# Patient Record
Sex: FEMALE | Race: BLACK OR AFRICAN AMERICAN | NOT HISPANIC OR LATINO | Employment: OTHER | URBAN - METROPOLITAN AREA
[De-identification: names, ages, dates, MRNs, and addresses within clinical notes are randomized per-mention and may not be internally consistent; named-entity substitution may affect disease eponyms.]

---

## 2023-02-09 PROBLEM — I10 HYPERTENSION: Status: ACTIVE | Noted: 2023-02-09

## 2023-02-09 PROBLEM — M25.539 WRIST PAIN: Status: ACTIVE | Noted: 2023-02-09

## 2023-02-09 PROBLEM — M25.561 ACUTE PAIN OF BOTH KNEES: Status: ACTIVE | Noted: 2023-02-09

## 2023-02-09 PROBLEM — E78.5 DYSLIPIDEMIA: Status: ACTIVE | Noted: 2023-02-09

## 2023-02-09 PROBLEM — M16.10 HIP ARTHRITIS: Status: ACTIVE | Noted: 2023-02-09

## 2023-02-09 PROBLEM — E78.5 HYPERLIPIDEMIA: Status: ACTIVE | Noted: 2023-02-09

## 2023-02-09 PROBLEM — N32.81 OVERACTIVE BLADDER: Status: ACTIVE | Noted: 2023-02-09

## 2023-02-09 PROBLEM — H61.23 IMPACTED CERUMEN OF BOTH EARS: Status: ACTIVE | Noted: 2023-02-09

## 2023-02-09 PROBLEM — K59.09 CHRONIC CONSTIPATION: Status: ACTIVE | Noted: 2023-02-09

## 2023-02-09 PROBLEM — R30.0 DYSURIA: Status: ACTIVE | Noted: 2023-02-09

## 2023-02-09 PROBLEM — I73.9 PVD (PERIPHERAL VASCULAR DISEASE) (CMS-HCC): Status: ACTIVE | Noted: 2023-02-09

## 2023-02-09 PROBLEM — G57.10 MERALGIA PARAESTHETICA: Status: ACTIVE | Noted: 2023-02-09

## 2023-02-09 PROBLEM — I25.10 CAD (CORONARY ARTERY DISEASE), NATIVE CORONARY ARTERY: Status: ACTIVE | Noted: 2023-02-09

## 2023-02-09 PROBLEM — R27.0 ATAXIA: Status: ACTIVE | Noted: 2023-02-09

## 2023-02-09 PROBLEM — M19.90 INFLAMMATORY ARTHRITIS: Status: ACTIVE | Noted: 2023-02-09

## 2023-02-09 PROBLEM — M25.562 ACUTE PAIN OF LEFT KNEE: Status: ACTIVE | Noted: 2023-02-09

## 2023-02-09 PROBLEM — Z95.5 CORONARY STENT PATENT: Status: ACTIVE | Noted: 2023-02-09

## 2023-02-09 PROBLEM — K64.8 INTERNAL HEMORRHOIDS: Status: ACTIVE | Noted: 2023-02-09

## 2023-02-09 PROBLEM — L30.9 ECZEMA: Status: ACTIVE | Noted: 2023-02-09

## 2023-02-09 PROBLEM — R73.09 ABNORMAL GLUCOSE: Status: ACTIVE | Noted: 2023-02-09

## 2023-02-09 PROBLEM — M48.062 SPINAL STENOSIS OF LUMBAR REGION WITH NEUROGENIC CLAUDICATION: Status: RESOLVED | Noted: 2023-02-09 | Resolved: 2023-02-09

## 2023-02-09 PROBLEM — M25.562 ACUTE PAIN OF BOTH KNEES: Status: ACTIVE | Noted: 2023-02-09

## 2023-02-09 PROBLEM — R20.0 NUMBNESS IN BOTH HANDS: Status: ACTIVE | Noted: 2023-02-09

## 2023-02-09 PROBLEM — R53.82 CHRONIC FATIGUE: Status: ACTIVE | Noted: 2023-02-09

## 2023-02-09 PROBLEM — M25.512 ACUTE PAIN OF LEFT SHOULDER: Status: ACTIVE | Noted: 2023-02-09

## 2023-02-09 PROBLEM — G62.9 NEUROPATHY: Status: ACTIVE | Noted: 2023-02-09

## 2023-02-09 PROBLEM — G25.81 RESTLESS LEGS SYNDROME: Status: ACTIVE | Noted: 2023-02-09

## 2023-02-09 PROBLEM — N39.0 BACTERIAL UTI: Status: ACTIVE | Noted: 2023-02-09

## 2023-02-09 PROBLEM — M72.2 PLANTAR FASCIITIS OF LEFT FOOT: Status: ACTIVE | Noted: 2023-02-09

## 2023-02-09 PROBLEM — M19.90 OSTEOARTHRITIS: Status: ACTIVE | Noted: 2023-02-09

## 2023-02-09 PROBLEM — M47.12 CERVICAL SPONDYLOSIS WITH MYELOPATHY: Status: ACTIVE | Noted: 2023-02-09

## 2023-02-09 PROBLEM — S70.01XA CONTUSION OF RIGHT HIP: Status: ACTIVE | Noted: 2023-02-09

## 2023-02-09 PROBLEM — K59.00 CONSTIPATION: Status: ACTIVE | Noted: 2023-02-09

## 2023-02-09 PROBLEM — M71.339: Status: ACTIVE | Noted: 2023-02-09

## 2023-02-09 PROBLEM — M48.10 DISH (DIFFUSE IDIOPATHIC SKELETAL HYPEROSTOSIS): Status: ACTIVE | Noted: 2023-02-09

## 2023-02-09 PROBLEM — M43.10 ACQUIRED SPONDYLOLISTHESIS: Status: ACTIVE | Noted: 2023-02-09

## 2023-02-09 PROBLEM — A49.9 BACTERIAL UTI: Status: ACTIVE | Noted: 2023-02-09

## 2023-02-09 PROBLEM — N39.0 UTI (URINARY TRACT INFECTION): Status: ACTIVE | Noted: 2023-02-09

## 2023-02-09 PROBLEM — R26.89 IMBALANCE: Status: ACTIVE | Noted: 2023-02-09

## 2023-02-09 PROBLEM — R92.8 ABNORMAL MAMMOGRAM: Status: ACTIVE | Noted: 2023-02-09

## 2023-02-09 PROBLEM — R92.1 BREAST CALCIFICATION SEEN ON MAMMOGRAM: Status: ACTIVE | Noted: 2023-02-09

## 2023-02-09 PROBLEM — B35.1 ONYCHOMYCOSIS: Status: ACTIVE | Noted: 2023-02-09

## 2023-02-09 PROBLEM — R20.0 THIGH NUMBNESS: Status: ACTIVE | Noted: 2023-02-09

## 2023-02-09 PROBLEM — M65.4 DE QUERVAIN'S TENOSYNOVITIS, LEFT: Status: ACTIVE | Noted: 2023-02-09

## 2023-02-09 PROBLEM — M35.9 UNDIFFERENTIATED CONNECTIVE TISSUE DISEASE (MULTI): Status: ACTIVE | Noted: 2023-02-09

## 2023-02-09 PROBLEM — R29.898 LEG WEAKNESS, BILATERAL: Status: ACTIVE | Noted: 2023-02-09

## 2023-02-09 PROBLEM — R07.9 CHEST PAIN: Status: ACTIVE | Noted: 2023-02-09

## 2023-02-09 PROBLEM — E11.9 DIABETES (MULTI): Status: ACTIVE | Noted: 2023-02-09

## 2023-02-09 PROBLEM — M79.673 HEEL PAIN: Status: ACTIVE | Noted: 2023-02-09

## 2023-02-09 RX ORDER — METOPROLOL TARTRATE 50 MG/1
1 TABLET ORAL DAILY
COMMUNITY

## 2023-02-09 RX ORDER — GLIMEPIRIDE 1 MG/1
1 TABLET ORAL DAILY
COMMUNITY
Start: 2017-12-06 | End: 2023-07-20

## 2023-02-09 RX ORDER — ASPIRIN 81 MG/1
1 TABLET ORAL DAILY
COMMUNITY

## 2023-02-09 RX ORDER — METFORMIN HYDROCHLORIDE 500 MG/1
1 TABLET ORAL
COMMUNITY
Start: 2017-08-28 | End: 2023-11-17 | Stop reason: SDUPTHER

## 2023-02-09 RX ORDER — AMLODIPINE BESYLATE 10 MG/1
1 TABLET ORAL DAILY
COMMUNITY
Start: 2020-07-06 | End: 2023-08-24

## 2023-02-09 RX ORDER — DICLOFENAC EPOLAMINE 0.01 G/1
SYSTEM TOPICAL 2 TIMES DAILY
COMMUNITY
Start: 2019-09-17

## 2023-02-09 RX ORDER — ROSUVASTATIN CALCIUM 20 MG/1
1 TABLET, COATED ORAL EVERY OTHER DAY
COMMUNITY
End: 2023-11-14

## 2023-02-09 RX ORDER — BENAZEPRIL HYDROCHLORIDE AND HYDROCHLOROTHIAZIDE 20; 25 MG/1; MG/1
1 TABLET ORAL DAILY
COMMUNITY
End: 2023-11-30

## 2023-02-09 RX ORDER — RANOLAZINE 500 MG/1
1 TABLET, EXTENDED RELEASE ORAL EVERY 12 HOURS
COMMUNITY
Start: 2017-06-26

## 2023-02-09 RX ORDER — DICLOFENAC SODIUM 10 MG/G
GEL TOPICAL DAILY
COMMUNITY
Start: 2019-09-17

## 2023-02-09 RX ORDER — CLOPIDOGREL BISULFATE 75 MG/1
1 TABLET ORAL DAILY
COMMUNITY
Start: 2014-08-01 | End: 2023-07-20

## 2023-02-09 RX ORDER — BLOOD SUGAR DIAGNOSTIC
STRIP MISCELLANEOUS 2 TIMES DAILY
COMMUNITY
Start: 2018-12-17

## 2023-02-09 RX ORDER — BROMPHENIRAMINE MALEATE, DEXTROMETHORPHAN HBR, PHENYLEPHRINE HCL, DIPHENHYDRAMINE HCL, PHENYLEPHRINE HCL 0.52G
1 KIT ORAL 2 TIMES DAILY
COMMUNITY
Start: 2021-09-21

## 2023-02-09 RX ORDER — BLOOD-GLUCOSE METER
EACH MISCELLANEOUS DAILY
COMMUNITY
Start: 2020-11-05

## 2023-02-09 RX ORDER — EZETIMIBE 10 MG/1
1 TABLET ORAL DAILY
COMMUNITY
Start: 2019-04-29 | End: 2023-08-01

## 2023-02-09 RX ORDER — IBUPROFEN 600 MG/1
1 TABLET ORAL 3 TIMES DAILY PRN
COMMUNITY
Start: 2015-04-27

## 2023-02-09 RX ORDER — POTASSIUM CHLORIDE 750 MG/1
1 TABLET, FILM COATED, EXTENDED RELEASE ORAL DAILY
COMMUNITY
Start: 2017-04-13

## 2023-02-09 RX ORDER — ROPINIROLE 1 MG/1
1 TABLET, FILM COATED ORAL NIGHTLY
COMMUNITY
Start: 2015-04-27 | End: 2023-12-04 | Stop reason: SDUPTHER

## 2023-02-09 RX ORDER — LANCETS
EACH MISCELLANEOUS DAILY
COMMUNITY

## 2023-02-09 RX ORDER — NITROGLYCERIN 0.4 MG/1
1 TABLET SUBLINGUAL AS NEEDED
COMMUNITY
End: 2023-04-07 | Stop reason: SDUPTHER

## 2023-02-09 RX ORDER — TRIAMCINOLONE ACETONIDE 1 MG/G
OINTMENT TOPICAL 2 TIMES DAILY
COMMUNITY
Start: 2022-04-12

## 2023-03-23 ENCOUNTER — APPOINTMENT (OUTPATIENT)
Dept: PRIMARY CARE | Facility: CLINIC | Age: 73
End: 2023-03-23
Payer: MEDICARE

## 2023-04-07 ENCOUNTER — OFFICE VISIT (OUTPATIENT)
Dept: PRIMARY CARE | Facility: CLINIC | Age: 73
End: 2023-04-07
Payer: MEDICARE

## 2023-04-07 VITALS
SYSTOLIC BLOOD PRESSURE: 130 MMHG | RESPIRATION RATE: 16 BRPM | DIASTOLIC BLOOD PRESSURE: 70 MMHG | BODY MASS INDEX: 29.55 KG/M2 | WEIGHT: 156.4 LBS | HEART RATE: 72 BPM | TEMPERATURE: 98.1 F

## 2023-04-07 DIAGNOSIS — I73.9 PVD (PERIPHERAL VASCULAR DISEASE) (CMS-HCC): ICD-10-CM

## 2023-04-07 DIAGNOSIS — I25.119 CORONARY ARTERY DISEASE INVOLVING NATIVE CORONARY ARTERY OF NATIVE HEART WITH ANGINA PECTORIS (CMS-HCC): ICD-10-CM

## 2023-04-07 DIAGNOSIS — I10 PRIMARY HYPERTENSION: Primary | ICD-10-CM

## 2023-04-07 DIAGNOSIS — E78.5 HYPERLIPIDEMIA, UNSPECIFIED HYPERLIPIDEMIA TYPE: ICD-10-CM

## 2023-04-07 DIAGNOSIS — E13.69 OTHER SPECIFIED DIABETES MELLITUS WITH OTHER SPECIFIED COMPLICATION, UNSPECIFIED WHETHER LONG TERM INSULIN USE (MULTI): ICD-10-CM

## 2023-04-07 DIAGNOSIS — M35.9 UNDIFFERENTIATED CONNECTIVE TISSUE DISEASE (MULTI): ICD-10-CM

## 2023-04-07 LAB
POC FINGERSTICK BLOOD GLUCOSE: 145 MG/DL (ref 70–100)
POC HDL CHOLESTEROL: 72 MG/DL (ref 0–50)
POC HEMOGLOBIN A1C: 7.4 % (ref 4.2–6.5)
POC LDL CHOLESTEROL: 74 MG/DL (ref 0–100)
POC NON-HDL CHOLESTEROL: 107 MG/DL (ref 0–130)
POC TOTAL CHOLESTEROL/HDL RATIO: 2.5 (ref 0–4.5)
POC TOTAL CHOLESTEROL: 179 MG/DL (ref 0–199)
POC TRIGLYCERIDES: 165 MG/DL (ref 0–150)

## 2023-04-07 PROCEDURE — 99214 OFFICE O/P EST MOD 30 MIN: CPT | Performed by: INTERNAL MEDICINE

## 2023-04-07 PROCEDURE — 3075F SYST BP GE 130 - 139MM HG: CPT | Performed by: INTERNAL MEDICINE

## 2023-04-07 PROCEDURE — 1160F RVW MEDS BY RX/DR IN RCRD: CPT | Performed by: INTERNAL MEDICINE

## 2023-04-07 PROCEDURE — 1036F TOBACCO NON-USER: CPT | Performed by: INTERNAL MEDICINE

## 2023-04-07 PROCEDURE — 83036 HEMOGLOBIN GLYCOSYLATED A1C: CPT | Performed by: INTERNAL MEDICINE

## 2023-04-07 PROCEDURE — 82962 GLUCOSE BLOOD TEST: CPT | Performed by: INTERNAL MEDICINE

## 2023-04-07 PROCEDURE — 3078F DIAST BP <80 MM HG: CPT | Performed by: INTERNAL MEDICINE

## 2023-04-07 PROCEDURE — 80061 LIPID PANEL: CPT | Performed by: INTERNAL MEDICINE

## 2023-04-07 PROCEDURE — 1159F MED LIST DOCD IN RCRD: CPT | Performed by: INTERNAL MEDICINE

## 2023-04-07 RX ORDER — FLASH GLUCOSE SENSOR
KIT MISCELLANEOUS
Qty: 1 EACH | Refills: 0 | Status: SHIPPED | OUTPATIENT
Start: 2023-04-07

## 2023-04-07 RX ORDER — NITROGLYCERIN 0.4 MG/1
0.4 TABLET SUBLINGUAL EVERY 5 MIN PRN
Qty: 90 TABLET | Refills: 1 | Status: SHIPPED | OUTPATIENT
Start: 2023-04-07 | End: 2023-12-04

## 2023-04-07 NOTE — PROGRESS NOTES
Nighat Holland is a 73 y.o. female   Patient with a past medical history of CAD s/p PCI, DM, HLD, HTN, Osteoarthritis, DISH, Spinal stenosis, PAD, Undifferentiated Connective Tissue disease, OAB, Neuropathy, Mammogram in June, Colonoscopy due in 2028    Dry mouth is bothering her, makes her cough    No chest pain/  SOB/ dizziness  BM OK  Energy level ok  Appetite OK             Review of Systems     Constitutional: no fever, no chills, not feeling poorly, not feeling tired and no recent weight gain, no recent weight loss.   ENT: no earache, no hearing loss, no nosebleeds, no nasal discharge, no sore throat and no hoarseness.   Cardiovascular: the heart rate was not slow, the heart rate was not fast, no chest pain, no palpitations, no intermittent leg claudication and no lower extremity edema.   Respiratory: no cough, wheezing or shortness of breath at rest or exertion  Gastrointestinal: no abdominal pain, no constipation, no melena, no nausea, no diarrhea, no vomiting and no blood in stools.   Musculoskeletal: no arthralgias, no myalgias, no back pain, no joint swelling, no joint stiffness, no limb pain and no limb swelling.   Integumentary: no skin rashes, no skin lesions, no itching, no skin wound and no dry skin.   Neurological: no headache, no confusion, no numbness, no dizziness, no tingling and no fainting.   All other systems have been reviewed and are negative for complaint.       Vitals:    04/07/23 1243   Temp: 36.7 °C (98.1 °F)        Physical Exam     Constitutional   General appearance: Alert and in no acute distress.     Pulmonary   Respiratory assessment: No respiratory distress, normal respiratory rhythm and effort.    Auscultation of Lungs: Clear bilateral breath sounds.   Cardiovascular   Auscultation of heart: Apical pulse normal, heart rate and rhythm normal, normal S1 and S2, no murmurs and no pericardial rub.    Exam trace edema: No peripheral edema.   Abdomen   Abdominal Exam: No bruits,  normal bowel sounds, soft, non-tender, no abdominal mass palpated.    Liver and Spleen exam: No hepato-splenomegaly.   Musculoskeletal   Examination of gait: Normal.    Inspection of digits and nails: No clubbing or cyanosis of the fingernails.    Inspection/palpation of joints, bones and muscles: No joint swelling. Normal movement of all extremities.   Skin   Skin inspection: Normal skin color and pigmentation, normal skin turgor and no visible rash.   Neurologic   Cranial nerves: Nerves 2-12 were intact, no focal neuro defects.     Assessment/Plan          Patient with a past medical history of CAD s/p PCI, DM, HLD, HTN, Osteoarthritis, DISH, Spinal stenosis, PAD,   Undifferentiated Connective Tissue disease, OAB, Neuropathy, Mammogram in June, Colonoscopy due in 2028        # HTN  condition is stable  continue current medications        # HLD  condition is stable  continue current medications        # DM  stable  watch diet     # Chronic Constipation  cont Metamucil     # UCTD  using CBD cream and its helping

## 2023-07-19 DIAGNOSIS — I10 HYPERTENSION, UNSPECIFIED TYPE: ICD-10-CM

## 2023-07-20 RX ORDER — CLOPIDOGREL BISULFATE 75 MG/1
75 TABLET ORAL DAILY
Qty: 90 TABLET | Refills: 3 | Status: SHIPPED | OUTPATIENT
Start: 2023-07-20

## 2023-07-20 RX ORDER — GLIMEPIRIDE 1 MG/1
1 TABLET ORAL DAILY
Qty: 90 TABLET | Refills: 3 | Status: SHIPPED | OUTPATIENT
Start: 2023-07-20 | End: 2024-04-18 | Stop reason: ALTCHOICE

## 2023-08-01 ENCOUNTER — OFFICE VISIT (OUTPATIENT)
Dept: PRIMARY CARE | Facility: CLINIC | Age: 73
End: 2023-08-01
Payer: MEDICARE

## 2023-08-01 VITALS
RESPIRATION RATE: 16 BRPM | DIASTOLIC BLOOD PRESSURE: 70 MMHG | TEMPERATURE: 97.9 F | WEIGHT: 156 LBS | SYSTOLIC BLOOD PRESSURE: 130 MMHG | HEART RATE: 70 BPM | BODY MASS INDEX: 29.48 KG/M2

## 2023-08-01 DIAGNOSIS — E78.5 DYSLIPIDEMIA: ICD-10-CM

## 2023-08-01 DIAGNOSIS — E78.49 OTHER HYPERLIPIDEMIA: ICD-10-CM

## 2023-08-01 DIAGNOSIS — H69.93 EUSTACHIAN TUBE DISORDER, BILATERAL: Primary | ICD-10-CM

## 2023-08-01 DIAGNOSIS — E13.69 OTHER SPECIFIED DIABETES MELLITUS WITH OTHER SPECIFIED COMPLICATION, UNSPECIFIED WHETHER LONG TERM INSULIN USE (MULTI): ICD-10-CM

## 2023-08-01 DIAGNOSIS — I10 PRIMARY HYPERTENSION: ICD-10-CM

## 2023-08-01 LAB
POC FINGERSTICK BLOOD GLUCOSE: 154 MG/DL (ref 70–100)
POC HEMOGLOBIN A1C: 7.5 % (ref 4.2–6.5)

## 2023-08-01 PROCEDURE — 82962 GLUCOSE BLOOD TEST: CPT | Performed by: INTERNAL MEDICINE

## 2023-08-01 PROCEDURE — 3078F DIAST BP <80 MM HG: CPT | Performed by: INTERNAL MEDICINE

## 2023-08-01 PROCEDURE — 1159F MED LIST DOCD IN RCRD: CPT | Performed by: INTERNAL MEDICINE

## 2023-08-01 PROCEDURE — 99214 OFFICE O/P EST MOD 30 MIN: CPT | Performed by: INTERNAL MEDICINE

## 2023-08-01 PROCEDURE — 1126F AMNT PAIN NOTED NONE PRSNT: CPT | Performed by: INTERNAL MEDICINE

## 2023-08-01 PROCEDURE — 1160F RVW MEDS BY RX/DR IN RCRD: CPT | Performed by: INTERNAL MEDICINE

## 2023-08-01 PROCEDURE — 83036 HEMOGLOBIN GLYCOSYLATED A1C: CPT | Performed by: INTERNAL MEDICINE

## 2023-08-01 PROCEDURE — 3075F SYST BP GE 130 - 139MM HG: CPT | Performed by: INTERNAL MEDICINE

## 2023-08-01 PROCEDURE — 1036F TOBACCO NON-USER: CPT | Performed by: INTERNAL MEDICINE

## 2023-08-01 RX ORDER — FLUTICASONE FUROATE 27.5 UG/1
2 SPRAY, METERED NASAL
Qty: 10 G | Refills: 5 | Status: SHIPPED | OUTPATIENT
Start: 2023-08-01 | End: 2023-12-04

## 2023-08-01 RX ORDER — EZETIMIBE 10 MG/1
10 TABLET ORAL DAILY
Qty: 90 TABLET | Refills: 0 | Status: SHIPPED | OUTPATIENT
Start: 2023-08-01 | End: 2023-10-26 | Stop reason: SDUPTHER

## 2023-08-01 ASSESSMENT — ENCOUNTER SYMPTOMS
OCCASIONAL FEELINGS OF UNSTEADINESS: 0
DEPRESSION: 0
LOSS OF SENSATION IN FEET: 0

## 2023-08-01 NOTE — PROGRESS NOTES
Nighat Holland is a 73 y.o. female   Patient with a past medical history of CAD s/p PCI, DM, HLD, HTN, Osteoarthritis, DISH, Spinal stenosis, PAD, Undifferentiated Connective Tissue disease, OAB, Neuropathy, Mammogram in June, Colonoscopy due in 2028    C/o ears plugged  Light headed  Dizzy (vertigo)  Comes and goes    No chest pain/  SOB/ palpitations  BM OK  Energy level ok  Appetite OK             Review of Systems     Constitutional: no fever, no chills, not feeling poorly, not feeling tired and no recent weight gain, no recent weight loss.   ENT: no earache, no hearing loss, no nosebleeds, no nasal discharge, no sore throat and no hoarseness.   Cardiovascular: the heart rate was not slow, the heart rate was not fast, no chest pain, no palpitations, no intermittent leg claudication and no lower extremity edema.   Respiratory: no cough, wheezing or shortness of breath at rest or exertion  Gastrointestinal: no abdominal pain, no constipation, no melena, no nausea, no diarrhea, no vomiting and no blood in stools.   Musculoskeletal: no arthralgias, no myalgias, no back pain, no joint swelling, no joint stiffness, no limb pain and no limb swelling.   Integumentary: no skin rashes, no skin lesions, no itching, no skin wound and no dry skin.   Neurological: no headache, no confusion, no numbness, no dizziness, no tingling and no fainting.   All other systems have been reviewed and are negative for complaint.       Vitals:    08/01/23 1056   BP: 130/70   Pulse: 70   Resp: 16   Temp: 36.6 °C (97.9 °F)        Physical Exam     Constitutional   General appearance: Alert and in no acute distress.     Pulmonary   Respiratory assessment: No respiratory distress, normal respiratory rhythm and effort.    Auscultation of Lungs: Clear bilateral breath sounds.   Cardiovascular   Auscultation of heart: Apical pulse normal, heart rate and rhythm normal, normal S1 and S2, no murmurs and no pericardial rub.    Exam trace edema: No  peripheral edema.   Abdomen   Abdominal Exam: No bruits, normal bowel sounds, soft, non-tender, no abdominal mass palpated.    Liver and Spleen exam: No hepato-splenomegaly.   Musculoskeletal   Examination of gait: Normal.    Inspection of digits and nails: No clubbing or cyanosis of the fingernails.    Inspection/palpation of joints, bones and muscles: No joint swelling. Normal movement of all extremities.   Skin   Skin inspection: Normal skin color and pigmentation, normal skin turgor and no visible rash.   Neurologic   Cranial nerves: Nerves 2-12 were intact, no focal neuro defects.     Assessment/Plan          Patient with a past medical history of CAD s/p PCI, DM, HLD, HTN, Osteoarthritis, DISH, Spinal stenosis, PAD,   Undifferentiated Connective Tissue disease, OAB, Neuropathy, Mammogram in June, Colonoscopy due in 2028        # HTN  condition is stable  continue current medications        # HLD  condition is stable  continue current medications        # DM  Up again  Start Jardiance (if covered, will stop Glimepiride, if not covered will increase to 2 mg)     # Chronic Constipation  cont Metamucil     # UCTD  using CBD cream and its helping    # Eustachian Tube Dysfunction  Start Flonase  ENT referral

## 2023-08-23 DIAGNOSIS — I10 HYPERTENSION, UNSPECIFIED TYPE: ICD-10-CM

## 2023-08-24 RX ORDER — AMLODIPINE BESYLATE 10 MG/1
10 TABLET ORAL DAILY
Qty: 90 TABLET | Refills: 3 | Status: SHIPPED | OUTPATIENT
Start: 2023-08-24 | End: 2024-04-18 | Stop reason: DRUGHIGH

## 2023-10-25 DIAGNOSIS — E78.5 DYSLIPIDEMIA: ICD-10-CM

## 2023-10-26 RX ORDER — EZETIMIBE 10 MG/1
10 TABLET ORAL DAILY
Qty: 90 TABLET | Refills: 3 | Status: SHIPPED | OUTPATIENT
Start: 2023-10-26

## 2023-10-30 ENCOUNTER — TELEPHONE (OUTPATIENT)
Dept: PRIMARY CARE | Facility: CLINIC | Age: 73
End: 2023-10-30
Payer: MEDICARE

## 2023-10-30 NOTE — TELEPHONE ENCOUNTER
PT called in and stated that her appt on 11/3 needs to be coded as her yearly visit. I was not sure if I could change the visit due to the time slot change please advise

## 2023-11-03 ENCOUNTER — APPOINTMENT (OUTPATIENT)
Dept: PRIMARY CARE | Facility: CLINIC | Age: 73
End: 2023-11-03
Payer: MEDICARE

## 2023-11-12 DIAGNOSIS — E78.49 OTHER HYPERLIPIDEMIA: Primary | ICD-10-CM

## 2023-11-14 RX ORDER — ROSUVASTATIN CALCIUM 20 MG/1
20 TABLET, COATED ORAL EVERY OTHER DAY
Qty: 45 TABLET | Refills: 3 | Status: SHIPPED | OUTPATIENT
Start: 2023-11-14 | End: 2023-12-04 | Stop reason: SDUPTHER

## 2023-11-17 DIAGNOSIS — E11.9 TYPE 2 DIABETES MELLITUS WITHOUT COMPLICATION, WITHOUT LONG-TERM CURRENT USE OF INSULIN (MULTI): Primary | ICD-10-CM

## 2023-11-17 RX ORDER — METFORMIN HYDROCHLORIDE 500 MG/1
500 TABLET ORAL
Qty: 180 TABLET | Refills: 0 | Status: SHIPPED | OUTPATIENT
Start: 2023-11-17 | End: 2024-01-16

## 2023-11-29 DIAGNOSIS — I10 PRIMARY HYPERTENSION: ICD-10-CM

## 2023-11-30 RX ORDER — BENAZEPRIL HYDROCHLORIDE AND HYDROCHLOROTHIAZIDE 20; 25 MG/1; MG/1
1 TABLET ORAL DAILY
Qty: 90 TABLET | Refills: 3 | Status: SHIPPED | OUTPATIENT
Start: 2023-11-30

## 2023-12-04 ENCOUNTER — OFFICE VISIT (OUTPATIENT)
Dept: PRIMARY CARE | Facility: CLINIC | Age: 73
End: 2023-12-04
Payer: MEDICARE

## 2023-12-04 VITALS
WEIGHT: 147 LBS | DIASTOLIC BLOOD PRESSURE: 70 MMHG | HEART RATE: 72 BPM | SYSTOLIC BLOOD PRESSURE: 120 MMHG | RESPIRATION RATE: 18 BRPM | BODY MASS INDEX: 27.78 KG/M2

## 2023-12-04 DIAGNOSIS — Z12.31 SCREENING MAMMOGRAM, ENCOUNTER FOR: ICD-10-CM

## 2023-12-04 DIAGNOSIS — E55.9 VITAMIN D DEFICIENCY: ICD-10-CM

## 2023-12-04 DIAGNOSIS — E78.49 OTHER HYPERLIPIDEMIA: ICD-10-CM

## 2023-12-04 DIAGNOSIS — I10 PRIMARY HYPERTENSION: Primary | ICD-10-CM

## 2023-12-04 DIAGNOSIS — E13.69 OTHER SPECIFIED DIABETES MELLITUS WITH OTHER SPECIFIED COMPLICATION, UNSPECIFIED WHETHER LONG TERM INSULIN USE (MULTI): ICD-10-CM

## 2023-12-04 DIAGNOSIS — E78.5 HYPERLIPIDEMIA, UNSPECIFIED HYPERLIPIDEMIA TYPE: ICD-10-CM

## 2023-12-04 PROBLEM — Z28.21 NO VACCINATION-PT REFUSE: Status: ACTIVE | Noted: 2023-12-04

## 2023-12-04 LAB
POC FINGERSTICK BLOOD GLUCOSE: 199 MG/DL (ref 70–100)
POC HDL CHOLESTEROL: 70 MG/DL (ref 0–50)
POC HEMOGLOBIN A1C: 7.1 % (ref 4.2–6.5)
POC LDL CHOLESTEROL: 116 MG/DL (ref 0–100)
POC NON-HDL CHOLESTEROL: 194 MG/DL (ref 0–130)
POC TOTAL CHOLESTEROL/HDL RATIO: 3.8 (ref 0–4.5)
POC TOTAL CHOLESTEROL: 264 MG/DL (ref 0–199)
POC TRIGLYCERIDES: 393 MG/DL (ref 0–150)

## 2023-12-04 PROCEDURE — 3074F SYST BP LT 130 MM HG: CPT | Performed by: INTERNAL MEDICINE

## 2023-12-04 PROCEDURE — 1126F AMNT PAIN NOTED NONE PRSNT: CPT | Performed by: INTERNAL MEDICINE

## 2023-12-04 PROCEDURE — 1036F TOBACCO NON-USER: CPT | Performed by: INTERNAL MEDICINE

## 2023-12-04 PROCEDURE — 99214 OFFICE O/P EST MOD 30 MIN: CPT | Performed by: INTERNAL MEDICINE

## 2023-12-04 PROCEDURE — 1160F RVW MEDS BY RX/DR IN RCRD: CPT | Performed by: INTERNAL MEDICINE

## 2023-12-04 PROCEDURE — 83036 HEMOGLOBIN GLYCOSYLATED A1C: CPT | Performed by: INTERNAL MEDICINE

## 2023-12-04 PROCEDURE — 1159F MED LIST DOCD IN RCRD: CPT | Performed by: INTERNAL MEDICINE

## 2023-12-04 PROCEDURE — 82962 GLUCOSE BLOOD TEST: CPT | Performed by: INTERNAL MEDICINE

## 2023-12-04 PROCEDURE — G0439 PPPS, SUBSEQ VISIT: HCPCS | Performed by: INTERNAL MEDICINE

## 2023-12-04 PROCEDURE — 3078F DIAST BP <80 MM HG: CPT | Performed by: INTERNAL MEDICINE

## 2023-12-04 PROCEDURE — 80061 LIPID PANEL: CPT | Performed by: INTERNAL MEDICINE

## 2023-12-04 RX ORDER — ROSUVASTATIN CALCIUM 20 MG/1
20 TABLET, COATED ORAL EVERY OTHER DAY
Qty: 45 TABLET | Refills: 3 | Status: SHIPPED | OUTPATIENT
Start: 2023-12-04

## 2023-12-04 RX ORDER — ROPINIROLE 1 MG/1
1 TABLET, FILM COATED ORAL NIGHTLY
Qty: 90 TABLET | Refills: 2 | Status: SHIPPED | OUTPATIENT
Start: 2023-12-04 | End: 2023-12-27 | Stop reason: SDUPTHER

## 2023-12-04 ASSESSMENT — PATIENT HEALTH QUESTIONNAIRE - PHQ9
2. FEELING DOWN, DEPRESSED OR HOPELESS: NOT AT ALL
1. LITTLE INTEREST OR PLEASURE IN DOING THINGS: NOT AT ALL
SUM OF ALL RESPONSES TO PHQ9 QUESTIONS 1 AND 2: 0

## 2023-12-04 ASSESSMENT — ENCOUNTER SYMPTOMS
LOSS OF SENSATION IN FEET: 0
DEPRESSION: 0
OCCASIONAL FEELINGS OF UNSTEADINESS: 0

## 2023-12-04 NOTE — PROGRESS NOTES
"Nighat Holland is a 73 y.o. female here for a Medicare Wellness Exam.    No chief complaint on file.       Patient with a past medical history of CAD s/p PCI, DM, HLD, HTN, Osteoarthritis, DISH, Spinal stenosis, PAD, Undifferentiated Connective Tissue disease, OAB, Neuropathy, Mammogram in June, Colonoscopy due in 2028    Feels fine  No chest pain/  SOB/ dizziness  BM OK  Energy level ok  Appetite OK    Cholesterol is up  Ran out         Medicare Wellness Exam    The patent is being seen for a follow up annual wellness visit  Past Medical, Surgical and family History: Reviewed and updated in chart  Interval History: Patient has not been hospitalized previously  Medications and Supplements: Review of all medications by a prescribing practitioner or clinical pharmacist (such as prescriptions, OTC, Herbal therapies and supplements) documented in the medical record.    Patient Self-Assessment of health: Good  Tobacco Use: No  Alcohol Use: Yes  Illicit drug use: No  Patient using opioids: No    Current Diet: in general, an \"unhealthy\" diet  Adequate fluid intake: Yes  Caffeine intake: Yes  Exercise frequency: moderately active    Depression/Suicide screening: PHQ2/ PHQ9 (see screenings tab)    Hearing impairment: Yes  Uses hearing aids N/A  Cognitive impairment Observation: No   Patient or family reported cognitive impairment: No    Bathing: independent  Dressing: independent  Walking: independent  Taking Medications: independent  Feeding: independent  Personal Hygiene: independent  Managing Finances: independent  Shopping: independent  Housework/Basic Home Maintenance: independent  Handling transportation: independent  Preparing meals: independent    Bowels: continent  Bladder: continent    Falls Risk: has notfallen in last 6 months.   Their fall has not resulted in an injury.   Fall risk Factors: Fall Risk Factors:    none   Care Plan Risk: Care Plan: Low/Moderate Risk: Regular physical activity such as walking, water " aerobics or srikanth chi to improve strength, balance, coordination and flexibility. Wear appropriate, sensible shoe wear. Remove fall hazards at home such as loose rugs, obstacles, use non-slip surface in bath or shower. Keep living space well lit.      Home Safety Risk Factors: Home Safety Risk Factors: None  Advanced Directives:  Living will: Yes POA: Yes    Patient's End of Life Decisions: Provider agree to follow.      Past Medical History:   Diagnosis Date    Chest pain, unspecified 12/29/2014    Chest pain    Encounter for gynecological examination (general) (routine) without abnormal findings     Pap test, as part of routine gynecological examination    Essential (primary) hypertension 11/15/2022    Hypertension    Hyperlipidemia, unspecified 11/15/2022    Hyperlipidemia    Personal history of other diseases of the musculoskeletal system and connective tissue     Personal history of arthritis    Personal history of other medical treatment     H/O mammogram    Unspecified atherosclerosis     Atherosclerosis        Review of Systems     Constitutional: no fever, no chills, not feeling poorly, not feeling tired and no recent weight gain, no recent weight loss.   ENT: no earache, no hearing loss, no nosebleeds, no nasal discharge, no sore throat and no hoarseness.   Cardiovascular: the heart rate was not slow, the heart rate was not fast, no chest pain, no palpitations, no intermittent leg claudication and no lower extremity edema.   Respiratory: no cough, wheezing or shortness of breath at rest or exertion  Gastrointestinal: no abdominal pain, no constipation, no melena, no nausea, no diarrhea, no vomiting and no blood in stools.   Musculoskeletal: no arthralgias, no myalgias, no back pain, no joint swelling, no joint stiffness, no limb pain and no limb swelling.   Integumentary: no skin rashes, no skin lesions, no itching, no skin wound and no dry skin.   Neurological: no headache, no confusion, no numbness, no  dizziness, no tingling and no fainting.   All other systems have been reviewed and are negative for complaint.          Physical Exam     Constitutional   General appearance: Alert and in no acute distress.     Pulmonary   Respiratory assessment: No respiratory distress, normal respiratory rhythm and effort.    Auscultation of Lungs: Clear bilateral breath sounds.   Cardiovascular   Auscultation of heart: Apical pulse normal, heart rate and rhythm normal, normal S1 and S2, no murmurs and no pericardial rub.    Exam for edema: No peripheral edema.   Abdomen   Abdominal Exam: No bruits, normal bowel sounds, soft, non-tender, no abdominal mass palpated.    Liver and Spleen exam: No hepato-splenomegaly.   Musculoskeletal   Examination of gait: Normal.    Inspection of digits and nails: No clubbing or cyanosis of the fingernails.    Inspection/palpation of joints, bones and muscles: No joint swelling. Normal movement of all extremities.   Skin   Skin inspection: Normal skin color and pigmentation, normal skin turgor and no visible rash.   Neurologic   Cranial nerves: Nerves 2-12 were intact, no focal neuro defects.          Assessment/Plan            Patient with a past medical history of CAD s/p PCI, DM, HLD, HTN, Osteoarthritis, DISH, Spinal stenosis, PAD, Undifferentiated Connective Tissue disease, OAB, Neuropathy, Mammogram in June, Colonoscopy due in 2028     # DM  Stable  Continue current medications    # HLD  Elevated   Was out f meds  Re start    # CAD  Stable  ASA    # HTN  Stable  Continue current medications

## 2023-12-26 DIAGNOSIS — E78.5 HYPERLIPIDEMIA, UNSPECIFIED HYPERLIPIDEMIA TYPE: ICD-10-CM

## 2023-12-26 DIAGNOSIS — I10 PRIMARY HYPERTENSION: ICD-10-CM

## 2023-12-26 DIAGNOSIS — E78.49 OTHER HYPERLIPIDEMIA: ICD-10-CM

## 2023-12-27 RX ORDER — ROPINIROLE 1 MG/1
1 TABLET, FILM COATED ORAL NIGHTLY
Qty: 90 TABLET | Refills: 2 | Status: SHIPPED | OUTPATIENT
Start: 2023-12-27 | End: 2023-12-29 | Stop reason: SDUPTHER

## 2023-12-29 DIAGNOSIS — I10 PRIMARY HYPERTENSION: ICD-10-CM

## 2023-12-29 DIAGNOSIS — E78.5 HYPERLIPIDEMIA, UNSPECIFIED HYPERLIPIDEMIA TYPE: ICD-10-CM

## 2023-12-29 DIAGNOSIS — E78.49 OTHER HYPERLIPIDEMIA: ICD-10-CM

## 2023-12-29 RX ORDER — ROPINIROLE 1 MG/1
1 TABLET, FILM COATED ORAL NIGHTLY
Qty: 90 TABLET | Refills: 2 | Status: SHIPPED | OUTPATIENT
Start: 2023-12-29 | End: 2024-02-05 | Stop reason: SDUPTHER

## 2024-01-04 ENCOUNTER — APPOINTMENT (OUTPATIENT)
Dept: RADIOLOGY | Facility: CLINIC | Age: 74
End: 2024-01-04
Payer: MEDICARE

## 2024-01-12 DIAGNOSIS — E11.9 TYPE 2 DIABETES MELLITUS WITHOUT COMPLICATION, WITHOUT LONG-TERM CURRENT USE OF INSULIN (MULTI): ICD-10-CM

## 2024-01-16 ENCOUNTER — ANCILLARY PROCEDURE (OUTPATIENT)
Dept: RADIOLOGY | Facility: CLINIC | Age: 74
End: 2024-01-16
Payer: MEDICARE

## 2024-01-16 ENCOUNTER — LAB (OUTPATIENT)
Dept: LAB | Facility: LAB | Age: 74
End: 2024-01-16
Payer: MEDICARE

## 2024-01-16 VITALS — BODY MASS INDEX: 27.76 KG/M2 | HEIGHT: 61 IN | WEIGHT: 147.05 LBS

## 2024-01-16 DIAGNOSIS — E78.5 HYPERLIPIDEMIA, UNSPECIFIED HYPERLIPIDEMIA TYPE: ICD-10-CM

## 2024-01-16 DIAGNOSIS — Z12.31 SCREENING MAMMOGRAM, ENCOUNTER FOR: ICD-10-CM

## 2024-01-16 DIAGNOSIS — E55.9 VITAMIN D DEFICIENCY: ICD-10-CM

## 2024-01-16 DIAGNOSIS — I10 PRIMARY HYPERTENSION: ICD-10-CM

## 2024-01-16 DIAGNOSIS — E13.69 OTHER SPECIFIED DIABETES MELLITUS WITH OTHER SPECIFIED COMPLICATION, UNSPECIFIED WHETHER LONG TERM INSULIN USE (MULTI): ICD-10-CM

## 2024-01-16 LAB
25(OH)D3 SERPL-MCNC: 76 NG/ML (ref 30–100)
ALBUMIN SERPL BCP-MCNC: 4.2 G/DL (ref 3.4–5)
ALP SERPL-CCNC: 58 U/L (ref 33–136)
ALT SERPL W P-5'-P-CCNC: 21 U/L (ref 7–45)
ANION GAP SERPL CALC-SCNC: 16 MMOL/L (ref 10–20)
AST SERPL W P-5'-P-CCNC: 14 U/L (ref 9–39)
BILIRUB SERPL-MCNC: 0.4 MG/DL (ref 0–1.2)
BUN SERPL-MCNC: 12 MG/DL (ref 6–23)
CALCIUM SERPL-MCNC: 10 MG/DL (ref 8.6–10.3)
CHLORIDE SERPL-SCNC: 98 MMOL/L (ref 98–107)
CO2 SERPL-SCNC: 25 MMOL/L (ref 21–32)
CREAT SERPL-MCNC: 0.67 MG/DL (ref 0.5–1.05)
EGFRCR SERPLBLD CKD-EPI 2021: >90 ML/MIN/1.73M*2
ERYTHROCYTE [DISTWIDTH] IN BLOOD BY AUTOMATED COUNT: 17.2 % (ref 11.5–14.5)
GLUCOSE SERPL-MCNC: 131 MG/DL (ref 74–99)
HCT VFR BLD AUTO: 37.7 % (ref 36–46)
HGB BLD-MCNC: 11.7 G/DL (ref 12–16)
MCH RBC QN AUTO: 23.8 PG (ref 26–34)
MCHC RBC AUTO-ENTMCNC: 31 G/DL (ref 32–36)
MCV RBC AUTO: 77 FL (ref 80–100)
NRBC BLD-RTO: 0 /100 WBCS (ref 0–0)
PLATELET # BLD AUTO: 331 X10*3/UL (ref 150–450)
POTASSIUM SERPL-SCNC: 4.6 MMOL/L (ref 3.5–5.3)
PROT SERPL-MCNC: 6.9 G/DL (ref 6.4–8.2)
RBC # BLD AUTO: 4.92 X10*6/UL (ref 4–5.2)
SODIUM SERPL-SCNC: 134 MMOL/L (ref 136–145)
TSH SERPL-ACNC: 2.17 MIU/L (ref 0.44–3.98)
WBC # BLD AUTO: 6 X10*3/UL (ref 4.4–11.3)

## 2024-01-16 PROCEDURE — 77067 SCR MAMMO BI INCL CAD: CPT

## 2024-01-16 PROCEDURE — 80053 COMPREHEN METABOLIC PANEL: CPT

## 2024-01-16 PROCEDURE — 77067 SCR MAMMO BI INCL CAD: CPT | Performed by: STUDENT IN AN ORGANIZED HEALTH CARE EDUCATION/TRAINING PROGRAM

## 2024-01-16 PROCEDURE — 84443 ASSAY THYROID STIM HORMONE: CPT

## 2024-01-16 PROCEDURE — 82306 VITAMIN D 25 HYDROXY: CPT

## 2024-01-16 PROCEDURE — 77063 BREAST TOMOSYNTHESIS BI: CPT | Performed by: STUDENT IN AN ORGANIZED HEALTH CARE EDUCATION/TRAINING PROGRAM

## 2024-01-16 PROCEDURE — 36415 COLL VENOUS BLD VENIPUNCTURE: CPT

## 2024-01-16 PROCEDURE — 85027 COMPLETE CBC AUTOMATED: CPT

## 2024-01-16 RX ORDER — METFORMIN HYDROCHLORIDE 500 MG/1
500 TABLET ORAL
Qty: 180 TABLET | Refills: 3 | Status: SHIPPED | OUTPATIENT
Start: 2024-01-16

## 2024-01-18 ENCOUNTER — OFFICE VISIT (OUTPATIENT)
Dept: OPHTHALMOLOGY | Facility: CLINIC | Age: 74
End: 2024-01-18
Payer: MEDICARE

## 2024-01-18 DIAGNOSIS — E78.5 HYPERLIPIDEMIA, UNSPECIFIED HYPERLIPIDEMIA TYPE: ICD-10-CM

## 2024-01-18 DIAGNOSIS — H25.813 COMBINED FORMS OF AGE-RELATED CATARACT OF BOTH EYES: ICD-10-CM

## 2024-01-18 DIAGNOSIS — H52.4 PRESBYOPIA: ICD-10-CM

## 2024-01-18 DIAGNOSIS — H53.8 BLURRED VISION: Primary | ICD-10-CM

## 2024-01-18 DIAGNOSIS — H04.123 DRY EYES: ICD-10-CM

## 2024-01-18 DIAGNOSIS — I10 PRIMARY HYPERTENSION: ICD-10-CM

## 2024-01-18 DIAGNOSIS — H52.223 REGULAR ASTIGMATISM OF BOTH EYES: ICD-10-CM

## 2024-01-18 DIAGNOSIS — H52.13 MYOPIA, BILATERAL: ICD-10-CM

## 2024-01-18 DIAGNOSIS — E13.69 OTHER SPECIFIED DIABETES MELLITUS WITH OTHER SPECIFIED COMPLICATION, UNSPECIFIED WHETHER LONG TERM INSULIN USE (MULTI): ICD-10-CM

## 2024-01-18 PROCEDURE — 92004 COMPRE OPH EXAM NEW PT 1/>: CPT | Performed by: OPHTHALMOLOGY

## 2024-01-18 PROCEDURE — 92015 DETERMINE REFRACTIVE STATE: CPT | Performed by: OPHTHALMOLOGY

## 2024-01-18 ASSESSMENT — REFRACTION_MANIFEST
OS_CYLINDER: -0.75
OD_AXIS: 109
OD_CYLINDER: -0.25
OS_SPHERE: -1.00
OS_AXIS: 100
OS_ADD: +3.00
OD_SPHERE: -0.25
OD_ADD: +3.00

## 2024-01-18 ASSESSMENT — VISUAL ACUITY
METHOD: SNELLEN - LINEAR
OD_PH_CC: 20/20
OS_PH_CC: 20/20
OS_CC: 20/25-2
OD_CC: 20/25-1

## 2024-01-18 ASSESSMENT — REFRACTION_WEARINGRX
OS_SPHERE: -1.75
OD_AXIS: 125
OS_AXIS: 125
OD_ADD: +2.75
OD_CYLINDER: -0.75
OS_CYLINDER: -0.75
OD_SPHERE: +0.50
OS_ADD: +2.75

## 2024-01-18 ASSESSMENT — TONOMETRY
OD_IOP_MMHG: 16
IOP_METHOD: GOLDMANN APPLANATION
OS_IOP_MMHG: 16

## 2024-01-18 ASSESSMENT — CUP TO DISC RATIO
OS_RATIO: 0.3
OD_RATIO: 0.3

## 2024-01-18 ASSESSMENT — EXTERNAL EXAM - RIGHT EYE: OD_EXAM: NORMAL

## 2024-01-18 ASSESSMENT — SLIT LAMP EXAM - LIDS
COMMENTS: NORMAL
COMMENTS: NORMAL

## 2024-01-18 ASSESSMENT — EXTERNAL EXAM - LEFT EYE: OS_EXAM: NORMAL

## 2024-01-18 NOTE — PROGRESS NOTES
Assessment/Plan   Diagnoses and all orders for this visit:  Blurred vision  Other specified diabetes mellitus with other specified complication, unspecified whether long term insulin use (CMS/[facility])  -no evidence of diabetic retinopathy at the present time  -pt was advised of the importance of good diabetes control and the importance of a yearly dilated diabetic exam    Dry eyes  -Start artificial tears both eyes (OU) qid  -stress compliance    Myopia, bilateral  Regular astigmatism of both eyes  Presbyopia  Refractive error  -give Rx for new glasses    Combined forms of age-related cataract of both eyes  Not visually significant at the present time  continue to monitor    Return for a dilated exam in   12  months or sooner if having any problems

## 2024-02-05 DIAGNOSIS — I10 PRIMARY HYPERTENSION: ICD-10-CM

## 2024-02-05 DIAGNOSIS — E78.49 OTHER HYPERLIPIDEMIA: ICD-10-CM

## 2024-02-05 DIAGNOSIS — E78.5 HYPERLIPIDEMIA, UNSPECIFIED HYPERLIPIDEMIA TYPE: ICD-10-CM

## 2024-02-05 RX ORDER — ROPINIROLE 1 MG/1
1 TABLET, FILM COATED ORAL NIGHTLY
Qty: 90 TABLET | Refills: 2 | Status: SHIPPED | OUTPATIENT
Start: 2024-02-05 | End: 2024-11-01

## 2024-03-19 ENCOUNTER — APPOINTMENT (OUTPATIENT)
Dept: PRIMARY CARE | Facility: CLINIC | Age: 74
End: 2024-03-19
Payer: MEDICARE

## 2024-04-18 ENCOUNTER — OFFICE VISIT (OUTPATIENT)
Dept: PRIMARY CARE | Facility: CLINIC | Age: 74
End: 2024-04-18
Payer: MEDICARE

## 2024-04-18 VITALS
HEART RATE: 68 BPM | RESPIRATION RATE: 16 BRPM | SYSTOLIC BLOOD PRESSURE: 130 MMHG | DIASTOLIC BLOOD PRESSURE: 70 MMHG | TEMPERATURE: 98.5 F | WEIGHT: 145.4 LBS | BODY MASS INDEX: 27.49 KG/M2

## 2024-04-18 DIAGNOSIS — E78.5 DYSLIPIDEMIA: ICD-10-CM

## 2024-04-18 DIAGNOSIS — J84.10 CALCIFIED GRANULOMA OF LUNG (MULTI): Primary | ICD-10-CM

## 2024-04-18 DIAGNOSIS — M35.9 UNDIFFERENTIATED CONNECTIVE TISSUE DISEASE (MULTI): ICD-10-CM

## 2024-04-18 DIAGNOSIS — E13.69 OTHER SPECIFIED DIABETES MELLITUS WITH OTHER SPECIFIED COMPLICATION, UNSPECIFIED WHETHER LONG TERM INSULIN USE (MULTI): ICD-10-CM

## 2024-04-18 DIAGNOSIS — I10 PRIMARY HYPERTENSION: ICD-10-CM

## 2024-04-18 DIAGNOSIS — I25.119 CORONARY ARTERY DISEASE INVOLVING NATIVE CORONARY ARTERY OF NATIVE HEART WITH ANGINA PECTORIS (CMS-HCC): ICD-10-CM

## 2024-04-18 DIAGNOSIS — I10 HYPERTENSION, UNSPECIFIED TYPE: ICD-10-CM

## 2024-04-18 DIAGNOSIS — E78.5 HYPERLIPIDEMIA, UNSPECIFIED HYPERLIPIDEMIA TYPE: ICD-10-CM

## 2024-04-18 PROBLEM — J98.4 CALCIFIED GRANULOMA OF LUNG: Status: ACTIVE | Noted: 2024-04-18

## 2024-04-18 PROBLEM — G82.20 PARAPARESIS (MULTI): Status: ACTIVE | Noted: 2023-02-09

## 2024-04-18 PROBLEM — E55.9 VITAMIN D DEFICIENCY: Status: ACTIVE | Noted: 2024-04-18

## 2024-04-18 PROBLEM — G82.20 PARAPARESIS (MULTI): Status: RESOLVED | Noted: 2023-02-09 | Resolved: 2024-04-18

## 2024-04-18 PROBLEM — R27.0 ATAXIA: Status: RESOLVED | Noted: 2023-02-09 | Resolved: 2024-04-18

## 2024-04-18 LAB
POC FINGERSTICK BLOOD GLUCOSE: 135 MG/DL (ref 70–100)
POC HEMOGLOBIN A1C: 7.1 % (ref 4.2–6.5)

## 2024-04-18 PROCEDURE — 3075F SYST BP GE 130 - 139MM HG: CPT | Performed by: INTERNAL MEDICINE

## 2024-04-18 PROCEDURE — 99214 OFFICE O/P EST MOD 30 MIN: CPT | Performed by: INTERNAL MEDICINE

## 2024-04-18 PROCEDURE — 1036F TOBACCO NON-USER: CPT | Performed by: INTERNAL MEDICINE

## 2024-04-18 PROCEDURE — 3078F DIAST BP <80 MM HG: CPT | Performed by: INTERNAL MEDICINE

## 2024-04-18 PROCEDURE — 1159F MED LIST DOCD IN RCRD: CPT | Performed by: INTERNAL MEDICINE

## 2024-04-18 PROCEDURE — 82962 GLUCOSE BLOOD TEST: CPT | Performed by: INTERNAL MEDICINE

## 2024-04-18 PROCEDURE — 83036 HEMOGLOBIN GLYCOSYLATED A1C: CPT | Performed by: INTERNAL MEDICINE

## 2024-04-18 RX ORDER — AMLODIPINE BESYLATE 10 MG/1
5 TABLET ORAL DAILY
Qty: 90 TABLET | Refills: 3 | Status: SHIPPED | OUTPATIENT
Start: 2024-04-18

## 2024-04-18 NOTE — PROGRESS NOTES
Nighat Holland is a 74 y.o. female   Patient with a past medical history of CAD s/p CABG x 3 in 2003 and s/p PCI of RCA, cardiomyopathy with a EF of 45%, carotid artery stenosis s/p right carotid endarterectomy,  DM, HLD, HTN, Osteoarthritis, DISH, Spinal stenosis, PAD, Undifferentiated Connective Tissue disease, OAB, Neuropathy, Mammogram in June, Colonoscopy due in 2028     Cardiology notes from [facility] reviewed  Drop in EF to 45%  Medications adjusted  Has been getting short of breath on exertion though    Feels fine  No chest pain/  SOB/ dizziness  BM OK  Energy level ok  Appetite OK    Cholesterol is up  Ran out         Review of Systems     Constitutional: no fever, no chills, not feeling poorly, not feeling tired and no recent weight gain, no recent weight loss.   ENT: no earache, no hearing loss, no nosebleeds, no nasal discharge, no sore throat and no hoarseness.   Cardiovascular: the heart rate was not slow, the heart rate was not fast, no chest pain, no palpitations, no intermittent leg claudication and no lower extremity edema.   Respiratory: no cough, wheezing or shortness of breath at rest or exertion  Gastrointestinal: no abdominal pain, no constipation, no melena, no nausea, no diarrhea, no vomiting and no blood in stools.   Musculoskeletal: no arthralgias, no myalgias, no back pain, no joint swelling, no joint stiffness, no limb pain and no limb swelling.   Integumentary: no skin rashes, no skin lesions, no itching, no skin wound and no dry skin.   Neurological: no headache, no confusion, no numbness, no dizziness, no tingling and no fainting.   All other systems have been reviewed and are negative for complaint.       Vitals:    04/18/24 1240   BP: 130/70   Pulse: 68   Resp: 16   Temp: 36.9 °C (98.5 °F)        Physical Exam     Constitutional   General appearance: Alert and in no acute distress.     Pulmonary   Respiratory assessment: No respiratory distress, normal respiratory rhythm and effort.     Auscultation of Lungs: Clear bilateral breath sounds.   Cardiovascular   Auscultation of heart: Apical pulse normal, heart rate and rhythm normal, normal S1 and S2, no murmurs and no pericardial rub.    Exam for edema: No peripheral edema.   Abdomen   Abdominal Exam: No bruits, normal bowel sounds, soft, non-tender, no abdominal mass palpated.    Liver and Spleen exam: No hepato-splenomegaly.   Musculoskeletal   Examination of gait: Normal.    Inspection of digits and nails: No clubbing or cyanosis of the fingernails.    Inspection/palpation of joints, bones and muscles: No joint swelling. Normal movement of all extremities.   Skin   Skin inspection: Normal skin color and pigmentation, normal skin turgor and no visible rash.   Neurologic   Cranial nerves: Nerves 2-12 were intact, no focal neuro defects.     Assessment/Plan          Patient with a past medical history of CAD s/p CABG x 3 in 2003 and s/p PCI of RCA, cardiomyopathy with a EF of 45%, carotid artery stenosis s/p right carotid endarterectomy,  DM, HLD, HTN, Osteoarthritis, DISH, Spinal stenosis, PAD, Undifferentiated Connective Tissue disease, OAB, Neuropathy, Mammogram in June, Colonoscopy due in 2028     # HTN  Stable  Continue current medications    # DM  Stable  Continue current medications  Off Amaryl    # CAD/ CMP  Continue Jardiance  Ranexa    # HLD  Target LDL is less than 70  Continue current medications  Watch salt, avoid excessive caffeine  Avoid processed meats/ sugars/juices Instead eat fresh fruit  Add walnuts and almonds to your diet  exercise 6 days a week for 30 minutes

## 2024-05-23 ENCOUNTER — OFFICE VISIT (OUTPATIENT)
Dept: OTOLARYNGOLOGY | Facility: CLINIC | Age: 74
End: 2024-05-23
Payer: MEDICARE

## 2024-05-23 VITALS — WEIGHT: 145 LBS | BODY MASS INDEX: 27.41 KG/M2

## 2024-05-23 DIAGNOSIS — H61.23 BILATERAL IMPACTED CERUMEN: Primary | ICD-10-CM

## 2024-05-23 PROCEDURE — 1036F TOBACCO NON-USER: CPT | Performed by: GENERAL PRACTICE

## 2024-05-23 PROCEDURE — 69210 REMOVE IMPACTED EAR WAX UNI: CPT | Performed by: GENERAL PRACTICE

## 2024-05-23 PROCEDURE — 99213 OFFICE O/P EST LOW 20 MIN: CPT | Performed by: GENERAL PRACTICE

## 2024-05-23 PROCEDURE — 1159F MED LIST DOCD IN RCRD: CPT | Performed by: GENERAL PRACTICE

## 2024-05-23 ASSESSMENT — PATIENT HEALTH QUESTIONNAIRE - PHQ9
1. LITTLE INTEREST OR PLEASURE IN DOING THINGS: NOT AT ALL
2. FEELING DOWN, DEPRESSED OR HOPELESS: NOT AT ALL
SUM OF ALL RESPONSES TO PHQ9 QUESTIONS 1 AND 2: 0

## 2024-05-23 NOTE — PROGRESS NOTES
Otolaryngology - Head and Neck Surgery Outpatient New Patient Visit Note        Assessment/Plan   Problem List Items Addressed This Visit    None  Visit Diagnoses         Codes    Bilateral impacted cerumen    -  Primary H61.23            Cerumen impaction b/l, relief of ear fullness with debridement. No otitis. TM mobile to valsalva.       Follow up:  -plan for follow up in clinic as needed    All of the above findings, impressions, treatment planning and follow up plans were discussed with the patient who indicated understanding.  the patient was instructed to contact or return to clinic sooner if symptoms/signs persist or worsen despite the above management.      Fede Huffman MD  Otolaryngology - Head and Neck Surgery            History Of Present Illness  Nighat Holland is a 74 y.o. female presenting for ear fullness b/l.  Associates with recent flight.       The patient reports a history of ear wax buildup requiring debridement    The pt reports gradual return of ear canal fullness and plugged sensation.  Reports some muffled hearing.    Denies otalgia, otorrhea.    Denies recent significant history of otitis media or externa.    Denies prior significant history of otologic surgery or trauma.             Past Medical History  She has a past medical history of Chest pain, unspecified (12/29/2014), Encounter for gynecological examination (general) (routine) without abnormal findings, Essential (primary) hypertension (11/15/2022), Hyperlipidemia, unspecified (11/15/2022), Personal history of other diseases of the musculoskeletal system and connective tissue, Personal history of other medical treatment, and Unspecified atherosclerosis.    Surgical History  She has a past surgical history that includes Other surgical history (02/10/2014); Other surgical history (02/10/2014); Colonoscopy (02/13/2014); Other surgical history (02/13/2014); Hysterectomy (02/13/2014); Other surgical history (02/13/2014); Coronary artery  bypass graft (02/13/2014); Tubal ligation (06/09/2014); and Other surgical history (08/26/2019).     Social History  She reports that she has never smoked. She has never used smokeless tobacco. She reports that she does not currently use alcohol. She reports that she does not use drugs.    Family History  Family History   Problem Relation Name Age of Onset    Colon cancer Mother      Liver cancer Mother      Colon cancer Father      Hypertension Father      Lung cancer Father      Colon cancer Other          Allergies  Other    Review of Systems  ROS: Pertinent positives as noted in HPI.    - CONSTITUTIONAL: Does not report weight loss, fever or chills.    - HEENT:   Ear: Does not report tinnitus, vertigo,    , otalgia, otorrhea  Nose: Does not report congestion, rhinorrhea, epistaxis, decreased smell  Throat: Does not report pain, dysphagia, odynophagia  Larynx: Does not report hoarseness,  difficulty breathing, pain with speaking (odynophonia)  Neck: Does not report new masses, pain, swelling  Face: Does not report sinus pain, pressure, swelling, numbness, weakness     - RESPIRATORY: Does not report SOB or cough.    - CV: Does not report palpitations or chest pain.     - GI: Does not report abdominal pain, nausea, vomiting or diarrhea.    - : Does not report dysuria or urinary frequency.    - MSK: Does not report myalgia or joint pain.    - SKIN: Does not report rash or pruritus.    - NEUROLOGICAL: Does not report headache or syncope.    - PSYCHIATRIC: Does not report recent changes in mood. Does not report anxiety or depression.         Physical Exam:     GENERAL:   Alert & Oriented to person, place and time; Normal affect and appearance. Well developed and well nourished. Conversant & cooperative with examination.     HEAD:   Normocephalic, atraumatic. No sinus tenderness to palpation. Normal parotid bilaterally. Normal facial strength.     NEUROLOGIC:   Cranial nerves II-XII grossly intact, gait WNL. Normal  mood and affect.    EYES:   Extraocular movements intact. Pupils equal, round, reactive to light and accommodation. No nystagmus, no ptosis. no scleral injection.    EAR:   Normal auricle. No discomfort or TTP with manipulation.   Handheld otoscopic exam showed normal external auditory canals bilaterally. No purulence or EAC inflammation. Obstructive cerumen b/l removed with forceps.   Right tympanic membrane clear and mobile without evidence of perforation, retraction or middle ear effusion.   Left tympanic membrane clear and mobile without evidence of perforation, retraction or middle ear effusion.     NOSE:   No external deformity. No external nasal lesions, lacerations, or scars. Nasal tip symmetrical with normal nasal valves.   Nasal cavity with essentially midline septum, normal mucosa and turbinates. No lesions, masses, purulence or polyps.     OC/OP:   Mucous membranes moist, no masses, lesions or exudates.   Normal tongue, floor of mouth, teeth, gums, lips. Normal posterior pharyngeal wall.    Normal tonsils without erythema, exudate or obvious calculi     NECK:   No neck masses or thyroid enlargement. Trachea midline. No tenderness to palpation    LYMPHATIC:   No cervical lymphadenopathy.     RESPIRATORY:   Symmetric chest elevation & no retractions. No significant hoarseness. No increased work of breathing.    CV:   No clubbing or cyanosis. No obvious edema    Skin:   No facial rashes, vesicles or lesions.     Extremities:   No gross abnormalities      Clinic Procedure    Binocular microscopy exam  Indication: tympanic membrane(s) could not be visualized adequately with handheld otoscopy.   Location:  bilateral ears  Visualization Instrument: A microscope was used to visualize through a speculum placed in the ear canal(s) to visualize the ear canal, tympanic membranes and to assist in assessment and removal of debris.  Findings:  see physical exam documentation  Patient Status: The patient tolerated the  procedure well.   Complications: There were no complications.     Information review:  External sources (notes, imaging, lab results) listed below personally reviewed to aid in medical decision making process.  -  -  -

## 2024-06-04 ENCOUNTER — OFFICE VISIT (OUTPATIENT)
Dept: NEUROLOGY | Facility: CLINIC | Age: 74
End: 2024-06-04
Payer: MEDICARE

## 2024-06-04 VITALS
BODY MASS INDEX: 27.15 KG/M2 | HEIGHT: 61 IN | HEART RATE: 69 BPM | SYSTOLIC BLOOD PRESSURE: 115 MMHG | DIASTOLIC BLOOD PRESSURE: 63 MMHG | WEIGHT: 143.8 LBS

## 2024-06-04 DIAGNOSIS — M48.062 SPINAL STENOSIS OF LUMBAR REGION WITH NEUROGENIC CLAUDICATION: ICD-10-CM

## 2024-06-04 DIAGNOSIS — M47.12 CERVICAL SPONDYLOSIS WITH MYELOPATHY: Primary | ICD-10-CM

## 2024-06-04 DIAGNOSIS — G25.81 RESTLESS LEGS SYNDROME: ICD-10-CM

## 2024-06-04 PROCEDURE — 3074F SYST BP LT 130 MM HG: CPT | Performed by: PSYCHIATRY & NEUROLOGY

## 2024-06-04 PROCEDURE — 3078F DIAST BP <80 MM HG: CPT | Performed by: PSYCHIATRY & NEUROLOGY

## 2024-06-04 PROCEDURE — 99214 OFFICE O/P EST MOD 30 MIN: CPT | Performed by: PSYCHIATRY & NEUROLOGY

## 2024-06-04 PROCEDURE — 1159F MED LIST DOCD IN RCRD: CPT | Performed by: PSYCHIATRY & NEUROLOGY

## 2024-06-04 PROCEDURE — 1160F RVW MEDS BY RX/DR IN RCRD: CPT | Performed by: PSYCHIATRY & NEUROLOGY

## 2024-06-04 NOTE — PATIENT INSTRUCTIONS
As we discussed, your neurological exam today is stable except that there is a bit of numbness in the left fifth finger.  This could be from the ulnar nerve at the elbow or could be from your cervical spine.  We discussed that you should try to avoid leaning on the left elbow and you should try to keep the left arm straight at the elbow is much as possible, making it less likely that you will be impinging the ulnar nerve.    If you note worsening weakness or worsening numbness of your left hand, contact my office and we can evaluate further such as with another EMG.  You indicated however that you are not interested in considering any surgical procedure except as a last resort.    I did not change the ropinirole regimen since it is controlling your restless legs symptoms.  Contact the office when you need refills.    Please see me in the office in 1 year.

## 2024-06-04 NOTE — PROGRESS NOTES
Subjective     Nighat Holland is a 74 y.o. year old female seen in follow-up for lumbar canal stenosis, cervical spondylosis with cord deformation, RLS.    HPI    74-year-old right-handed woman with past medical history significant for hypertension, diabetes, coronary disease status post CABG in 2003 and stenting, right carotid endarterectomy in 2003, hysterectomy, DISH, dyslipidemia. She gives neurologic history significant for restless legs syndrome noted for about the past 5 years, improved by a bedtime dose of ropinirole.     I saw her initially on 4/12/19 for a complaint of numbness over the anterior thighs. She described a relatively diffuse area of disturbance, more so than typical for meralgia paresthetica. She had undergone an EMG in 2013 by Dr. Victor, unremarkable but not assessing lateral femoral cutaneous responses. I recommended thoracic spine MRI, which showed no cord compression, cord signal abnormality or other obvious explanation of her symptoms.     I saw her again on 12/13/19 and her complaints had not improved. I sent her for an MRI of the lumbar spine at that time, which showed moderately severe canal stenosis at L4/5. I recommended spine surgery consultation with Dr. Hernandes. She did see him but opted not to consider lumbar spine surgery.     I saw her on 9/21/2020 via virtual visit, at which visit her complaints were more diffuse. I recommended cervical spine MRI which showed spondylosis most severe at C5/6 and C6/7, at which levels there is at least mild cord deformation. Possibly a small focus of right hemicord signal abnormality on axials. On STIR sagittal there is a linear hyperintensity extending from C5-C7 within the center of the cord, which is not well appreciated on T2.     When I discussed the cervical spine MRI results with her I recommended she consult with surgery, but she indicated that she did not wish to do so at the present time and that spine surgery would be a last resort.  "Accordingly I reviewed cervical spine precautions. I then saw her in the office for an exam on 10/21/2020.     I evaluated her again on 4/19/2021, by A/V visit. She indicated improved symptoms since starting an exercise class. I recommended she continue the 1 mg nightly dose of ropinirole.     I evaluated her again on 5/10/2022 in the office. She indicated for the most part stable complaints but did endorse a relatively new complaint of numbness in both hands. I recommended EMG to evaluate for median neuropathy at the wrist.     EMG showed no evidence of right or left median neuropathy at the wrist. No evidence of right or left cervical radiculopathy.    I evaluated her most recently on 5/2/2023 in the office.  She reported adequate control of RLS.  The previous complaint of hand numbness had improved considerably.  She did not endorse symptoms of worsening cervical myelopathy.    She is evaluated again today in the office.    Her RLS remains under good control on ropinirole 1 mg nightly.  She is perceiving no side effects of ropinirole.    She reports no major issues with gait/balance.  She denies falls and does not generally require assistive devices; she has a cane that she uses only on rare occasions, mainly because it has a folding seat that she can use if she needs to rest.    She denies numbness in the fingertips on a constant basis.  She does endorse paresthesias in the left upper extremity exacerbated by leaning on the elbow or holding the elbow bent.  Her  strength is reported as \"terrible\" which she relates to arthritic fingers.  She has assistive devices to help with opening bottles and jars.    Subjectively leg strength is reasonably good.  She has no difficulty climbing stairs.    Walking endurance is reported as stable at roughly 3-4 minutes before she needs to rest.  She does aerobics, dancing and chair exercises.  Standing endurance is reported as reasonably good.    She does note low back pain " particularly first thing in the morning and applies CBD cream which helps.  Only very rarely does she take ibuprofen.    She denies bladder or bowel dysfunction.    She indicates that she has lost about 15 pounds on Jardiance.    Review of Systems    As per the history of present illness    Patient Active Problem List   Diagnosis    Abnormal glucose    Abnormal mammogram    Acquired spondylolisthesis    Acute pain of both knees    Acute pain of left knee    Acute pain of left shoulder    Bacterial UTI    Breast calcification seen on mammogram    Coronary artery disease involving native coronary artery of native heart with angina pectoris (CMS-[facility])    Cervical spondylosis with myelopathy    Chest pain    Chronic constipation    Chronic fatigue    Constipation    Contusion of right hip    Coronary stent patent    De Quervain's tenosynovitis, left    Diabetes (Multi)    DISH (diffuse idiopathic skeletal hyperostosis)    Dyslipidemia    Dysuria    Eczema    Heel pain    Hip arthritis    Hyperlipidemia    Hypertension    Imbalance    Impacted cerumen of both ears    Inflammatory arthritis    Internal hemorrhoids    Meralgia paraesthetica    Neuropathy    Numbness in both hands    Onychomycosis    Osteoarthritis    Other bursal cyst, unspecified wrist    Overactive bladder    Plantar fasciitis of left foot    PVD (peripheral vascular disease) (CMS-[facility])    Restless legs syndrome    Thigh numbness    Undifferentiated connective tissue disease (Multi)    UTI (urinary tract infection)    Wrist pain    No vaccination-pt refuse    Vitamin D deficiency    Calcified granuloma of lung (Multi)     Past Medical History:   Diagnosis Date    Chest pain, unspecified 12/29/2014    Chest pain    Encounter for gynecological examination (general) (routine) without abnormal findings     Pap test, as part of routine gynecological examination    Essential (primary) hypertension 11/15/2022    Hypertension    Hyperlipidemia, unspecified  11/15/2022    Hyperlipidemia    Personal history of other diseases of the musculoskeletal system and connective tissue     Personal history of arthritis    Personal history of other medical treatment     H/O mammogram    Unspecified atherosclerosis     Atherosclerosis     Past Surgical History:   Procedure Laterality Date    COLONOSCOPY  02/13/2014    Complete Colonoscopy    CORONARY ARTERY BYPASS GRAFT  02/13/2014    Coronary Artery Surgery    HYSTERECTOMY  02/13/2014    Hysterectomy    OTHER SURGICAL HISTORY  02/10/2014    Enteroenterostomy, Anastomosis    OTHER SURGICAL HISTORY  02/10/2014    Bypass Graft (Non-Vein) Aortic-carotid    OTHER SURGICAL HISTORY  02/13/2014    Cardiac Catheter His Ablation    OTHER SURGICAL HISTORY  02/13/2014    Neurological Surgery Carotid Endarterectomy    OTHER SURGICAL HISTORY  08/26/2019    Cardiac catheterization with stent placement    TUBAL LIGATION  06/09/2014    Tubal Ligation     Social History     Tobacco Use    Smoking status: Never    Smokeless tobacco: Never   Substance Use Topics    Alcohol use: Not Currently     family history includes Colon cancer in her father, mother, and another family member; Hypertension in her father; Liver cancer in her mother; Lung cancer in her father.    Current Outpatient Medications:     amLODIPine (Norvasc) 10 mg tablet, Take 0.5 tablets (5 mg) by mouth once daily., Disp: 90 tablet, Rfl: 3    aspirin 81 mg EC tablet, Take 1 tablet (81 mg) by mouth once daily., Disp: , Rfl:     benazepriL-hydrochlorothiazide (Lotensin HCT) 20-25 mg tablet, TAKE 1 TABLET BY MOUTH DAILY, Disp: 90 tablet, Rfl: 3    blood sugar diagnostic (Contour Next Test Strips) strip, in the morning and at bedtime., Disp: , Rfl:     blood sugar diagnostic (OneTouch Verio test strips) strip, 1 (one) time each day., Disp: , Rfl:     clopidogrel (Plavix) 75 mg tablet, TAKE 1 TABLET BY MOUTH  DAILY, Disp: 90 tablet, Rfl: 3    diclofenac epolamine 1.3 % patch, Place on the  skin twice a day. APPLY PATCH TO AFFECTED AREA, Disp: , Rfl:     diclofenac sodium (Voltaren) 1 % gel gel, Place on the skin 1 (one) time each day. APPLY SPARINGLY TO AFFECTED AREA, Disp: , Rfl:     empagliflozin (Jardiance) 25 mg, Take 1 tablet (25 mg) by mouth once daily., Disp: 90 tablet, Rfl: 2    ezetimibe (Zetia) 10 mg tablet, TAKE 1 TABLET BY MOUTH DAILY, Disp: 90 tablet, Rfl: 3    fluticasone (Flonase Sensimist) 27.5 mcg/actuation nasal spray, Administer 2 sprays into each nostril once daily., Disp: 10 g, Rfl: 5    FreeStyle Chato sensor system (FreeStyle Chato 2 Sensor) kit, Use as instructed, Disp: 1 each, Rfl: 0    ibuprofen 600 mg tablet, Take 1 tablet (600 mg) by mouth 3 times a day as needed (take with food)., Disp: , Rfl:     lancets (OneTouch UltraSoft Lancets) misc, 1 (one) time each day., Disp: , Rfl:     metFORMIN (Glucophage) 500 mg tablet, TAKE 1 TABLET BY MOUTH TWICE  DAILY WITH MEALS, Disp: 180 tablet, Rfl: 3    metoprolol tartrate (Lopressor) 50 mg tablet, Take 1 tablet by mouth once daily., Disp: , Rfl:     nitroglycerin (Nitrostat) 0.4 mg SL tablet, Place 1 tablet (0.4 mg) under the tongue every 5 minutes if needed for chest pain., Disp: 90 tablet, Rfl: 1    potassium chloride CR 10 mEq ER tablet, Take 1 tablet (10 mEq) by mouth once daily., Disp: , Rfl:     psyllium (Metamucil) 0.52 gram capsule, Take 1 capsule (0.52 g) by mouth 2 times a day., Disp: , Rfl:     ranolazine (Ranexa) 500 mg 12 hr tablet, Take 1 tablet (500 mg) by mouth every 12 hours., Disp: , Rfl:     rOPINIRole (Requip) 1 mg tablet, Take 1 tablet (1 mg) by mouth once daily at bedtime., Disp: 90 tablet, Rfl: 2    rosuvastatin (Crestor) 20 mg tablet, Take 1 tablet (20 mg) by mouth every other day., Disp: 45 tablet, Rfl: 3    triamcinolone (Kenalog) 0.1 % ointment, twice a day. APPLY AND GENTLY MASSAGE INTO AFFECTED AREA(S), Disp: , Rfl:   Allergies   Allergen Reactions    Other Unknown       Objective   Neurological  Exam  Physical Exam    Physical Examination:    General: Alert woman who was ambulatory without assistive devices.      Motor: Muscle tone was normal throughout.  There was no pronator drift or asymmetry of finger taps.  Confrontation strength was symmetrically 5/5 throughout with the following exceptions: 4+ left middle deltoid, 4-4+ bilateral triceps and left ADM (the latter confounded by digit 5 pain), 4+ bilateral hip flexors and hamstrings.    Sensation: Pin was symmetrically sharp over the volar fingertips with the exception of the volar and dorsal aspects of left hand digit 5.  Pin was sharp over the left ulnar palm and dorsal ulnar portion of the back of the hand.  Romberg sign was absent.    Station: Intact and stable.    Gait: Stable and unremarkable.  Intact tandem.        Assessment/Plan     She reports good control of RLS and I did not change her ropinirole regimen.    She does not have history or exam findings concerning for progressively worsening cervical myelopathy.  She reiterates in any event that she does not want to consider any surgery except as an absolute last resort.    She has some sensory reduction in left hand digit 5 on testing and mild weakness of the ADM although exam is potentially confounded by arthritic pain in the finger.  I discussed that she may have mild ulnar neuropathy or at least transient irritation of the ulnar nerve at the elbow.  I reviewed conservative measures for management of this.  Should it progressively worsen she is to let me know and we can proceed with EMG but again she does not want to consider any kind of surgical intervention.    She is stable from the standpoint of lumbar stenosis complaints.  She notes adequate relief of intermittent back pain with CBD applied topically.    I advised her to follow-up in the office in 1 year.

## 2024-06-27 DIAGNOSIS — I10 HYPERTENSION, UNSPECIFIED TYPE: ICD-10-CM

## 2024-06-27 RX ORDER — CLOPIDOGREL BISULFATE 75 MG/1
75 TABLET ORAL DAILY
Qty: 90 TABLET | Refills: 3 | Status: SHIPPED | OUTPATIENT
Start: 2024-06-27

## 2024-07-24 DIAGNOSIS — I10 PRIMARY HYPERTENSION: ICD-10-CM

## 2024-07-24 RX ORDER — BENAZEPRIL HYDROCHLORIDE AND HYDROCHLOROTHIAZIDE 20; 25 MG/1; MG/1
1 TABLET ORAL DAILY
Qty: 90 TABLET | Refills: 3 | Status: SHIPPED | OUTPATIENT
Start: 2024-07-24

## 2024-07-24 NOTE — TELEPHONE ENCOUNTER
Rx Refill Request Telephone Encounter    Name:  Nighat Holland  :  [id number]  Specific Pharmacy location:  [facility]

## 2024-08-08 ENCOUNTER — APPOINTMENT (OUTPATIENT)
Dept: PRIMARY CARE | Facility: CLINIC | Age: 74
End: 2024-08-08
Payer: MEDICARE

## 2024-08-08 VITALS
HEART RATE: 68 BPM | TEMPERATURE: 97.8 F | SYSTOLIC BLOOD PRESSURE: 120 MMHG | DIASTOLIC BLOOD PRESSURE: 80 MMHG | RESPIRATION RATE: 16 BRPM | BODY MASS INDEX: 26.91 KG/M2 | WEIGHT: 142.4 LBS

## 2024-08-08 DIAGNOSIS — I10 PRIMARY HYPERTENSION: Primary | ICD-10-CM

## 2024-08-08 DIAGNOSIS — I25.119 CORONARY ARTERY DISEASE INVOLVING NATIVE CORONARY ARTERY OF NATIVE HEART WITH ANGINA PECTORIS (CMS-HCC): ICD-10-CM

## 2024-08-08 DIAGNOSIS — E78.5 DYSLIPIDEMIA: ICD-10-CM

## 2024-08-08 DIAGNOSIS — M19.90 INFLAMMATORY ARTHRITIS: ICD-10-CM

## 2024-08-08 DIAGNOSIS — E13.69 OTHER SPECIFIED DIABETES MELLITUS WITH OTHER SPECIFIED COMPLICATION, UNSPECIFIED WHETHER LONG TERM INSULIN USE (MULTI): ICD-10-CM

## 2024-08-08 LAB
POC FINGERSTICK BLOOD GLUCOSE: 157 MG/DL (ref 70–100)
POC HEMOGLOBIN A1C: 7.5 % (ref 4.2–6.5)

## 2024-08-08 PROCEDURE — 3074F SYST BP LT 130 MM HG: CPT | Performed by: INTERNAL MEDICINE

## 2024-08-08 PROCEDURE — 3079F DIAST BP 80-89 MM HG: CPT | Performed by: INTERNAL MEDICINE

## 2024-08-08 PROCEDURE — 99214 OFFICE O/P EST MOD 30 MIN: CPT | Performed by: INTERNAL MEDICINE

## 2024-08-08 PROCEDURE — 83036 HEMOGLOBIN GLYCOSYLATED A1C: CPT | Performed by: INTERNAL MEDICINE

## 2024-08-08 PROCEDURE — 1036F TOBACCO NON-USER: CPT | Performed by: INTERNAL MEDICINE

## 2024-08-08 PROCEDURE — 82962 GLUCOSE BLOOD TEST: CPT | Performed by: INTERNAL MEDICINE

## 2024-08-08 RX ORDER — IBUPROFEN 600 MG/1
600 TABLET ORAL 3 TIMES DAILY PRN
Qty: 90 TABLET | Refills: 1 | Status: SHIPPED | OUTPATIENT
Start: 2024-08-08

## 2024-08-08 RX ORDER — METOPROLOL SUCCINATE 50 MG/1
75 TABLET, EXTENDED RELEASE ORAL DAILY
Qty: 45 TABLET | Refills: 5 | Status: SHIPPED | OUTPATIENT
Start: 2024-08-08 | End: 2025-02-04

## 2024-08-08 NOTE — PROGRESS NOTES
Nighat Holland is a 74 y.o. female   Patient with a past medical history of CAD s/p CABG x 3 in 2003 and s/p PCI of RCA, cardiomyopathy with a EF of 45%, carotid artery stenosis s/p right carotid endarterectomy,  DM, HLD, HTN, Osteoarthritis, DISH, Spinal stenosis, PAD, Undifferentiated Connective Tissue disease, OAB, Neuropathy, Mammogram in June, Colonoscopy due in 2028     Feels fine  No chest pain/  SOB/ dizziness  BM OK  Energy level ok  Appetite OK    Average blood sugars 120  No polyuria  No polydipsia  Nocturia            Review of Systems     Constitutional: no fever, no chills, not feeling poorly, not feeling tired and no recent weight gain, no recent weight loss.   ENT: no earache, no hearing loss, no nosebleeds, no nasal discharge, no sore throat and no hoarseness.   Cardiovascular: the heart rate was not slow, the heart rate was not fast, no chest pain, no palpitations, no intermittent leg claudication and no lower extremity edema.   Respiratory: no cough, wheezing or shortness of breath at rest or exertion  Gastrointestinal: no abdominal pain, no constipation, no melena, no nausea, no diarrhea, no vomiting and no blood in stools.   Musculoskeletal: no arthralgias, no myalgias, no back pain, no joint swelling, no joint stiffness, no limb pain and no limb swelling.   Integumentary: no skin rashes, no skin lesions, no itching, no skin wound and no dry skin.   Neurological: no headache, no confusion, no numbness, no dizziness, no tingling and no fainting.   All other systems have been reviewed and are negative for complaint.     Current Outpatient Medications   Medication Instructions    amLODIPine (NORVASC) 5 mg, oral, Daily    aspirin 81 mg EC tablet 1 tablet, oral, Daily    benazepriL-hydrochlorothiazide (Lotensin HCT) 20-25 mg tablet 1 tablet, oral, Daily    blood sugar diagnostic (Contour Next Test Strips) strip 2 times daily    blood sugar diagnostic (OneTouch Verio test strips) strip Daily     clopidogrel (PLAVIX) 75 mg, oral, Daily    diclofenac epolamine 1.3 % patch transdermal, 2 times daily, APPLY PATCH TO AFFECTED AREA    diclofenac sodium (Voltaren) 1 % gel gel transdermal, Daily, APPLY SPARINGLY TO AFFECTED AREA    empagliflozin (JARDIANCE) 25 mg, oral, Daily    ezetimibe (ZETIA) 10 mg, oral, Daily    fluticasone (Flonase Sensimist) 27.5 mcg/actuation nasal spray 2 sprays, Each Nostril, Daily RT    FreeStyle Chato sensor system (FreeStyle Chato 2 Sensor) kit Use as instructed    ibuprofen 600 mg tablet 1 tablet, oral, 3 times daily PRN    lancets (OneTouch UltraSoft Lancets) misc miscellaneous, Daily    metFORMIN (GLUCOPHAGE) 500 mg, oral, 2 times daily (morning and late afternoon)    metoprolol tartrate (Lopressor) 50 mg tablet 1 tablet, oral, Daily    nitroglycerin (NITROSTAT) 0.4 mg, sublingual, Every 5 min PRN    potassium chloride CR 10 mEq ER tablet 1 tablet, oral, Daily    psyllium (Metamucil) 0.52 gram capsule 1 capsule, oral, 2 times daily    ranolazine (Ranexa) 500 mg 12 hr tablet 1 tablet, oral, Every 12 hours    rOPINIRole (REQUIP) 1 mg, oral, Nightly    rosuvastatin (CRESTOR) 20 mg, oral, Every other day    triamcinolone (Kenalog) 0.1 % ointment 2 times daily, APPLY AND GENTLY MASSAGE INTO AFFECTED AREA(S)         Vitals:    08/08/24 1113   Temp: 36.6 °C (97.8 °F)        Physical Exam     Constitutional   General appearance: Alert and in no acute distress.     Pulmonary   Respiratory assessment: No respiratory distress, normal respiratory rhythm and effort.    Auscultation of Lungs: Clear bilateral breath sounds.   Cardiovascular   Auscultation of heart: Apical pulse normal, heart rate and rhythm normal, normal S1 and S2, no murmurs and no pericardial rub.    Exam for edema: No peripheral edema.   Abdomen   Abdominal Exam: No bruits, normal bowel sounds, soft, non-tender, no abdominal mass palpated.    Liver and Spleen exam: No hepato-splenomegaly.   Musculoskeletal   Examination of  gait: Normal.    Inspection of digits and nails: No clubbing or cyanosis of the fingernails.    Inspection/palpation of joints, bones and muscles: No joint swelling. Normal movement of all extremities.   Skin   Skin inspection: Normal skin color and pigmentation, normal skin turgor and no visible rash.   Neurologic   Cranial nerves: Nerves 2-12 were intact, no focal neuro defects.    Assessment/Plan          Patient with a past medical history of CAD s/p CABG x 3 in 2003 and s/p PCI of RCA, cardiomyopathy with a EF of 45%, carotid artery stenosis s/p right carotid endarterectomy, DM, HLD, HTN, Osteoarthritis, DISH, Spinal stenosis, PAD, Undifferentiated Connective Tissue disease, OAB, Neuropathy, Mammogram in June, Colonoscopy due in 2028     # HTN  Stable  Continue current medications    # HLD  Stable  Continue current medications  Watch salt, avoid excessive caffeine  Avoid processed meats/ sugars/juices Instead eat fresh fruit  Add walnuts and almonds to your diet  exercise 6 days a week for 30 minutes    # DM  Rise in A1c  Not watching diet  Cut back on the sweets    # CAD  Does get symptomatic on occasions  Metoprolol increased to 1 and a 1/2 tablet by cardiology    # OA  Minimize use of Ibuprofen  Take tylenol first  CBD Oil is helping too

## 2024-08-20 DIAGNOSIS — I10 PRIMARY HYPERTENSION: ICD-10-CM

## 2024-08-20 DIAGNOSIS — E13.69 OTHER SPECIFIED DIABETES MELLITUS WITH OTHER SPECIFIED COMPLICATION, UNSPECIFIED WHETHER LONG TERM INSULIN USE (MULTI): ICD-10-CM

## 2024-08-20 DIAGNOSIS — E78.5 HYPERLIPIDEMIA, UNSPECIFIED HYPERLIPIDEMIA TYPE: ICD-10-CM

## 2024-08-28 DIAGNOSIS — I10 HYPERTENSION, UNSPECIFIED TYPE: ICD-10-CM

## 2024-08-28 DIAGNOSIS — I10 PRIMARY HYPERTENSION: ICD-10-CM

## 2024-08-28 DIAGNOSIS — I25.119 CORONARY ARTERY DISEASE INVOLVING NATIVE CORONARY ARTERY OF NATIVE HEART WITH ANGINA PECTORIS (CMS-HCC): ICD-10-CM

## 2024-08-28 DIAGNOSIS — E13.69 OTHER SPECIFIED DIABETES MELLITUS WITH OTHER SPECIFIED COMPLICATION, UNSPECIFIED WHETHER LONG TERM INSULIN USE (MULTI): ICD-10-CM

## 2024-08-28 RX ORDER — METOPROLOL SUCCINATE 50 MG/1
75 TABLET, EXTENDED RELEASE ORAL DAILY
Qty: 45 TABLET | Refills: 5 | Status: SHIPPED | OUTPATIENT
Start: 2024-08-28 | End: 2025-02-24

## 2024-08-28 RX ORDER — BENAZEPRIL HYDROCHLORIDE AND HYDROCHLOROTHIAZIDE 20; 25 MG/1; MG/1
1 TABLET ORAL DAILY
Qty: 90 TABLET | Refills: 3 | Status: SHIPPED | OUTPATIENT
Start: 2024-08-28

## 2024-08-28 RX ORDER — CLOPIDOGREL BISULFATE 75 MG/1
75 TABLET ORAL DAILY
Qty: 90 TABLET | Refills: 3 | Status: SHIPPED | OUTPATIENT
Start: 2024-08-28

## 2024-08-28 NOTE — TELEPHONE ENCOUNTER
Rx Refill Request Telephone Encounter    Name:  Nighat Holland  :  [id number]  Specific Pharmacy location:  [facility]

## 2024-08-28 NOTE — TELEPHONE ENCOUNTER
Rx Refill Request Telephone Encounter     Specific Pharmacy location: [facility]   Pt called in and stated to send rx to [facility].

## 2024-11-11 DIAGNOSIS — E78.49 OTHER HYPERLIPIDEMIA: ICD-10-CM

## 2024-11-11 DIAGNOSIS — E78.5 HYPERLIPIDEMIA, UNSPECIFIED HYPERLIPIDEMIA TYPE: ICD-10-CM

## 2024-11-11 DIAGNOSIS — I10 PRIMARY HYPERTENSION: ICD-10-CM

## 2024-11-12 RX ORDER — ROPINIROLE 1 MG/1
1 TABLET, FILM COATED ORAL NIGHTLY
Qty: 90 TABLET | Refills: 3 | Status: SHIPPED | OUTPATIENT
Start: 2024-11-12

## 2024-11-25 ENCOUNTER — LAB (OUTPATIENT)
Dept: LAB | Facility: LAB | Age: 74
End: 2024-11-25
Payer: MEDICARE

## 2024-11-25 DIAGNOSIS — E78.5 HYPERLIPIDEMIA, UNSPECIFIED HYPERLIPIDEMIA TYPE: ICD-10-CM

## 2024-11-25 DIAGNOSIS — E78.49 OTHER HYPERLIPIDEMIA: ICD-10-CM

## 2024-11-25 DIAGNOSIS — I10 PRIMARY HYPERTENSION: ICD-10-CM

## 2024-11-25 LAB
ALBUMIN SERPL BCP-MCNC: 4.6 G/DL (ref 3.4–5)
ALP SERPL-CCNC: 56 U/L (ref 33–136)
ALT SERPL W P-5'-P-CCNC: 18 U/L (ref 7–45)
ANION GAP SERPL CALC-SCNC: 15 MMOL/L (ref 10–20)
AST SERPL W P-5'-P-CCNC: 16 U/L (ref 9–39)
BILIRUB SERPL-MCNC: 0.5 MG/DL (ref 0–1.2)
BUN SERPL-MCNC: 15 MG/DL (ref 6–23)
CALCIUM SERPL-MCNC: 9.6 MG/DL (ref 8.6–10.3)
CHLORIDE SERPL-SCNC: 101 MMOL/L (ref 98–107)
CO2 SERPL-SCNC: 25 MMOL/L (ref 21–32)
CREAT SERPL-MCNC: 0.74 MG/DL (ref 0.5–1.05)
EGFRCR SERPLBLD CKD-EPI 2021: 84 ML/MIN/1.73M*2
ERYTHROCYTE [DISTWIDTH] IN BLOOD BY AUTOMATED COUNT: 17.1 % (ref 11.5–14.5)
GLUCOSE SERPL-MCNC: 147 MG/DL (ref 74–99)
HCT VFR BLD AUTO: 39 % (ref 36–46)
HGB BLD-MCNC: 11.8 G/DL (ref 12–16)
MCH RBC QN AUTO: 22.8 PG (ref 26–34)
MCHC RBC AUTO-ENTMCNC: 30.3 G/DL (ref 32–36)
MCV RBC AUTO: 75 FL (ref 80–100)
NRBC BLD-RTO: 0 /100 WBCS (ref 0–0)
PLATELET # BLD AUTO: 310 X10*3/UL (ref 150–450)
POTASSIUM SERPL-SCNC: 4.7 MMOL/L (ref 3.5–5.3)
PROT SERPL-MCNC: 7 G/DL (ref 6.4–8.2)
RBC # BLD AUTO: 5.17 X10*6/UL (ref 4–5.2)
SODIUM SERPL-SCNC: 136 MMOL/L (ref 136–145)
TSH SERPL-ACNC: 2.92 MIU/L (ref 0.44–3.98)
WBC # BLD AUTO: 6.2 X10*3/UL (ref 4.4–11.3)

## 2024-11-25 PROCEDURE — 80053 COMPREHEN METABOLIC PANEL: CPT

## 2024-11-25 PROCEDURE — 36415 COLL VENOUS BLD VENIPUNCTURE: CPT

## 2024-11-25 PROCEDURE — 85027 COMPLETE CBC AUTOMATED: CPT

## 2024-11-25 PROCEDURE — 84443 ASSAY THYROID STIM HORMONE: CPT

## 2024-12-10 ENCOUNTER — APPOINTMENT (OUTPATIENT)
Dept: PRIMARY CARE | Facility: CLINIC | Age: 74
End: 2024-12-10
Payer: MEDICARE

## 2024-12-10 VITALS
BODY MASS INDEX: 26.94 KG/M2 | RESPIRATION RATE: 16 BRPM | SYSTOLIC BLOOD PRESSURE: 110 MMHG | WEIGHT: 142.6 LBS | DIASTOLIC BLOOD PRESSURE: 70 MMHG | TEMPERATURE: 97.8 F | HEART RATE: 68 BPM

## 2024-12-10 DIAGNOSIS — E13.69 OTHER SPECIFIED DIABETES MELLITUS WITH OTHER SPECIFIED COMPLICATION, UNSPECIFIED WHETHER LONG TERM INSULIN USE (MULTI): ICD-10-CM

## 2024-12-10 DIAGNOSIS — I10 PRIMARY HYPERTENSION: Primary | ICD-10-CM

## 2024-12-10 DIAGNOSIS — E78.5 DYSLIPIDEMIA: ICD-10-CM

## 2024-12-10 DIAGNOSIS — Z12.31 SCREENING MAMMOGRAM, ENCOUNTER FOR: ICD-10-CM

## 2024-12-10 DIAGNOSIS — D50.9 IRON DEFICIENCY ANEMIA, UNSPECIFIED IRON DEFICIENCY ANEMIA TYPE: ICD-10-CM

## 2024-12-10 LAB
POC FINGERSTICK BLOOD GLUCOSE: 157 MG/DL (ref 70–100)
POC HEMOGLOBIN A1C: 7.5 % (ref 4.2–6.5)

## 2024-12-10 PROCEDURE — 99214 OFFICE O/P EST MOD 30 MIN: CPT | Performed by: INTERNAL MEDICINE

## 2024-12-10 PROCEDURE — G0444 DEPRESSION SCREEN ANNUAL: HCPCS | Performed by: INTERNAL MEDICINE

## 2024-12-10 PROCEDURE — 3078F DIAST BP <80 MM HG: CPT | Performed by: INTERNAL MEDICINE

## 2024-12-10 PROCEDURE — 1160F RVW MEDS BY RX/DR IN RCRD: CPT | Performed by: INTERNAL MEDICINE

## 2024-12-10 PROCEDURE — G0439 PPPS, SUBSEQ VISIT: HCPCS | Performed by: INTERNAL MEDICINE

## 2024-12-10 PROCEDURE — 1158F ADVNC CARE PLAN TLK DOCD: CPT | Performed by: INTERNAL MEDICINE

## 2024-12-10 PROCEDURE — 83036 HEMOGLOBIN GLYCOSYLATED A1C: CPT | Performed by: INTERNAL MEDICINE

## 2024-12-10 PROCEDURE — 82962 GLUCOSE BLOOD TEST: CPT | Performed by: INTERNAL MEDICINE

## 2024-12-10 PROCEDURE — 1123F ACP DISCUSS/DSCN MKR DOCD: CPT | Performed by: INTERNAL MEDICINE

## 2024-12-10 PROCEDURE — 1036F TOBACCO NON-USER: CPT | Performed by: INTERNAL MEDICINE

## 2024-12-10 PROCEDURE — 1159F MED LIST DOCD IN RCRD: CPT | Performed by: INTERNAL MEDICINE

## 2024-12-10 PROCEDURE — 3074F SYST BP LT 130 MM HG: CPT | Performed by: INTERNAL MEDICINE

## 2024-12-10 RX ORDER — DULAGLUTIDE 0.75 MG/.5ML
0.75 INJECTION, SOLUTION SUBCUTANEOUS WEEKLY
Qty: 2 ML | Refills: 11 | Status: SHIPPED | OUTPATIENT
Start: 2024-12-10

## 2024-12-10 ASSESSMENT — ENCOUNTER SYMPTOMS
OCCASIONAL FEELINGS OF UNSTEADINESS: 0
LOSS OF SENSATION IN FEET: 0
DEPRESSION: 0

## 2024-12-10 ASSESSMENT — PATIENT HEALTH QUESTIONNAIRE - PHQ9
2. FEELING DOWN, DEPRESSED OR HOPELESS: NOT AT ALL
SUM OF ALL RESPONSES TO PHQ9 QUESTIONS 1 AND 2: 0
1. LITTLE INTEREST OR PLEASURE IN DOING THINGS: NOT AT ALL

## 2024-12-10 NOTE — PROGRESS NOTES
"Nighat Holland is a 74 y.o. female here for a Medicare Wellness Exam.    Chief Complaint   Patient presents with    Annual Exam        Patient with a past medical history of CAD s/p CABG x 3 in 2003 and s/p PCI of RCA, cardiomyopathy with a EF of 45%, carotid artery stenosis s/p right carotid endarterectomy, DM, HLD, HTN, Osteoarthritis, DISH, Spinal stenosis, PAD, Undifferentiated Connective Tissue disease, OAB, Neuropathy, Mammogram in June, Colonoscopy due in 2028     Feels fine  No chest pain/  SOB/ dizziness  BM OK  Energy level ok  Appetite OK             Medicare Wellness Exam    The patent is being seen for a follow up annual wellness visit  Past Medical, Surgical and family History: Reviewed and updated in chart  Interval History: Patient has not been hospitalized previously  Medications and Supplements: Review of all medications by a prescribing practitioner or clinical pharmacist (such as prescriptions, OTC, Herbal therapies and supplements) documented in the medical record.    Patient Self-Assessment of health: Good  Tobacco Use: No  Alcohol Use: No  Illicit drug use: No  Patient using opioids: No    Current Diet: in general, an \"unhealthy\" diet  Adequate fluid intake: Yes  Caffeine intake: Yes  Exercise frequency: moderately active    Depression/Suicide screening: PHQ2/ PHQ9 (see screenings tab)    Hearing impairment: No  Uses hearing aids N/A  Cognitive impairment Observation: No   Patient or family reported cognitive impairment: No    Bathing: independent  Dressing: independent  Walking: independent  Taking Medications: independent  Feeding: independent  Personal Hygiene: independent  Managing Finances: independent  Shopping: independent  Housework/Basic Home Maintenance: independent  Handling transportation: independent  Preparing meals: independent    Bowels: continent  Bladder: incontinent    Falls Risk: has notfallen in last 6 months.   Their fall has not resulted in an injury.   Fall risk " Factors: Fall Risk Factors: Antihypertensive Use none   Care Plan Risk: Care Plan: Low/Moderate Risk: Regular physical activity such as walking, water aerobics or srikanth chi to improve strength, balance, coordination and flexibility. Wear appropriate, sensible shoe wear. Remove fall hazards at home such as loose rugs, obstacles, use non-slip surface in bath or shower. Keep living space well lit.      Home Safety Risk Factors: Home Safety Risk Factors: None  Advanced Directives:  Living will: Yes POA: Yes    Patient's End of Life Decisions: Provider agree to follow.      Past Medical History:   Diagnosis Date    Chest pain, unspecified 12/29/2014    Chest pain    Encounter for gynecological examination (general) (routine) without abnormal findings     Pap test, as part of routine gynecological examination    Essential (primary) hypertension 11/15/2022    Hypertension    Hyperlipidemia, unspecified 11/15/2022    Hyperlipidemia    Personal history of other diseases of the musculoskeletal system and connective tissue     Personal history of arthritis    Personal history of other medical treatment     H/O mammogram    Unspecified atherosclerosis     Atherosclerosis        Current Outpatient Medications   Medication Instructions    amLODIPine (NORVASC) 5 mg, oral, Daily    aspirin 81 mg EC tablet 1 tablet, oral, Daily    benazepriL-hydrochlorothiazide (Lotensin HCT) 20-25 mg tablet 1 tablet, oral, Daily    blood sugar diagnostic (OneTouch Verio test strips) strip Daily    clopidogrel (PLAVIX) 75 mg, oral, Daily    diclofenac epolamine 1.3 % patch transdermal, 2 times daily, APPLY PATCH TO AFFECTED AREA    diclofenac sodium (Voltaren) 1 % gel gel transdermal, Daily, APPLY SPARINGLY TO AFFECTED AREA    empagliflozin (JARDIANCE) 25 mg, oral, Daily    ezetimibe (ZETIA) 10 mg, oral, Daily    FreeStyle Chato sensor system (FreeStyle Chato 2 Sensor) kit Use as instructed    ibuprofen 600 mg, oral, 3 times daily PRN    lancets  (OneTouch UltraSoft Lancets) misc miscellaneous, Daily    metFORMIN (GLUCOPHAGE) 500 mg, oral, 2 times daily (morning and late afternoon)    metoprolol succinate XL (TOPROL-XL) 75 mg, oral, Daily, Do not crush or chew.    nitroglycerin (NITROSTAT) 0.4 mg, sublingual, Every 5 min PRN    potassium chloride CR 10 mEq ER tablet 1 tablet, oral, Daily    psyllium (Metamucil) 0.52 gram capsule 1 capsule, oral, 2 times daily    ranolazine (Ranexa) 500 mg 12 hr tablet 1 tablet, oral, Every 12 hours    rOPINIRole (REQUIP) 1 mg, oral, Nightly    rosuvastatin (CRESTOR) 20 mg, oral, Every other day    triamcinolone (Kenalog) 0.1 % ointment 2 times daily, APPLY AND GENTLY MASSAGE INTO AFFECTED AREA(S)       Review of Systems     Constitutional: no fever, no chills, not feeling poorly, not feeling tired and no recent weight gain, no recent weight loss.   ENT: no earache, no hearing loss, no nosebleeds, no nasal discharge, no sore throat and no hoarseness.   Cardiovascular: the heart rate was not slow, the heart rate was not fast, no chest pain, no palpitations, no intermittent leg claudication and no lower extremity edema.   Respiratory: no cough, wheezing or shortness of breath at rest or exertion  Gastrointestinal: no abdominal pain, no constipation, no melena, no nausea, no diarrhea, no vomiting and no blood in stools.   Musculoskeletal: no arthralgias, no myalgias, no back pain, no joint swelling, no joint stiffness, no limb pain and no limb swelling.   Integumentary: no skin rashes, no skin lesions, no itching, no skin wound and no dry skin.   Neurological: no headache, no confusion, no numbness, no dizziness, no tingling and no fainting.   All other systems have been reviewed and are negative for complaint.        Physical Exam    Constitutional   General appearance: Alert and in no acute distress.     Pulmonary   Respiratory assessment: No respiratory distress, normal respiratory rhythm and effort.    Auscultation of Lungs:  Clear bilateral breath sounds.   Cardiovascular   Auscultation of heart: Apical pulse normal, heart rate and rhythm normal, normal S1 and S2, no murmurs and no pericardial rub.    Exam for edema: No peripheral edema.   Abdomen   Abdominal Exam: No bruits, normal bowel sounds, soft, non-tender, no abdominal mass palpated.    Liver and Spleen exam: No hepato-splenomegaly.   Musculoskeletal   Examination of gait: Normal.    Inspection of digits and nails: No clubbing or cyanosis of the fingernails.    Inspection/palpation of joints, bones and muscles: No joint swelling. Normal movement of all extremities.   Skin   Skin inspection: Normal skin color and pigmentation, normal skin turgor and no visible rash.   Neurologic   Cranial nerves: Nerves 2-12 were intact, no focal neuro defects.       Assessment/Plan          Patient with a past medical history of CAD s/p CABG x 3 in 2003 and s/p PCI of RCA, cardiomyopathy with a EF of 45%, carotid artery stenosis s/p right carotid endarterectomy, DM, HLD, HTN, Osteoarthritis, DISH, Spinal stenosis, PAD, Undifferentiated Connective Tissue disease, OAB, Neuropathy, Mammogram in June, Colonoscopy due in 2028      # HTN  Stable  Continue current medications     # HLD  Stable  Continue current medications  Watch salt, avoid excessive caffeine  Avoid processed meats/ sugars/juices Instead eat fresh fruit  Add walnuts and almonds to your diet  exercise 6 days a week for 30 minutes     # DM  Rise in A1c  Start Trulicity     # CAD  Stable  Continue current medications       # OA  Minimize use of Ibuprofen  Take tylenol first  CBD Oil is helping too    Advance Directives Discussion  less than 30 minutes spent discussing Advanced Care Planning (including a Living Will, Healthcare POA, as well as specific end of life choices and/or directives). The details of that discussion were documented in Advanced Directives Discussion section of the medical record.         Depression Screening  _2_  minutes  were spent screening for depression using PHQ2/PHQ9 as documented in the chart.     Alcohol Screening  _1_ minutes were spent screening for alcohol use/misuse as documented in the chart.          Cardiac Risk Assessment  _2_minutes were spent assessing and discussing cardiovascular risk was and, if needed, lifestyle modifications recommended, including nutritional choices, exercise, and elimination of habits contributing to risk. Aspirin use/disuse was discussed following the guidelines below.     Low dose ASA ( mg) should be considered:  If you have prior Heart Attack/Stroke/Peripheral vascular disease:  Generally recommend daily low dose aspirin unless extremely high bleeding risk (e.g., gastrointestinal).     If you do not have prior Heart Attack/Stroke/Peripheral vascular disease:    Age < 70 and your 10-year cardiovascular disease risk is >20%, use low dose Aspirin   Age >=70: Do not use Aspirin for prevention  Low Dose CT Screening  We discussed the pros and cons of low dose CT screening for lung cancer based on the patient's smoking history.  This screening is recommended for patients who:  Are between the age of 50 to 77  Have a 20 pack-year smoking history (20 years of smoking a pack a day)  Are either a current smoker or have quit smoking within the last 15 years  Are in good health - no new cough or unexplained weight loss  Are willing to do the follow-up testing and treatment, if needed  Have not had a chest CT (CAT) scan in the last year

## 2024-12-13 DIAGNOSIS — E78.49 OTHER HYPERLIPIDEMIA: ICD-10-CM

## 2024-12-16 RX ORDER — ROSUVASTATIN CALCIUM 20 MG/1
20 TABLET, COATED ORAL EVERY OTHER DAY
Qty: 45 TABLET | Refills: 3 | Status: SHIPPED | OUTPATIENT
Start: 2024-12-16

## 2024-12-18 DIAGNOSIS — E78.5 DYSLIPIDEMIA: ICD-10-CM

## 2024-12-20 RX ORDER — EZETIMIBE 10 MG/1
10 TABLET ORAL DAILY
Qty: 90 TABLET | Refills: 3 | Status: SHIPPED | OUTPATIENT
Start: 2024-12-20

## 2025-01-21 DIAGNOSIS — I25.119 CORONARY ARTERY DISEASE INVOLVING NATIVE CORONARY ARTERY OF NATIVE HEART WITH ANGINA PECTORIS: ICD-10-CM

## 2025-01-22 RX ORDER — METOPROLOL SUCCINATE 50 MG/1
TABLET, EXTENDED RELEASE ORAL
Qty: 135 TABLET | Refills: 3 | Status: SHIPPED | OUTPATIENT
Start: 2025-01-22

## 2025-01-23 ENCOUNTER — APPOINTMENT (OUTPATIENT)
Dept: OPHTHALMOLOGY | Facility: CLINIC | Age: 75
End: 2025-01-23
Payer: MEDICARE

## 2025-01-23 ENCOUNTER — HOSPITAL ENCOUNTER (OUTPATIENT)
Dept: RADIOLOGY | Facility: CLINIC | Age: 75
Discharge: HOME | End: 2025-01-23
Payer: MEDICARE

## 2025-01-23 VITALS — HEIGHT: 61 IN | WEIGHT: 142.64 LBS | BODY MASS INDEX: 26.93 KG/M2

## 2025-01-23 DIAGNOSIS — H53.8 BLURRED VISION: ICD-10-CM

## 2025-01-23 DIAGNOSIS — H52.223 REGULAR ASTIGMATISM OF BOTH EYES: ICD-10-CM

## 2025-01-23 DIAGNOSIS — H04.123 DRY EYES: ICD-10-CM

## 2025-01-23 DIAGNOSIS — H25.813 COMBINED FORMS OF AGE-RELATED CATARACT OF BOTH EYES: ICD-10-CM

## 2025-01-23 DIAGNOSIS — E11.9 TYPE 2 DIABETES MELLITUS WITHOUT COMPLICATION, WITHOUT LONG-TERM CURRENT USE OF INSULIN (MULTI): Primary | ICD-10-CM

## 2025-01-23 DIAGNOSIS — Z12.31 SCREENING MAMMOGRAM, ENCOUNTER FOR: ICD-10-CM

## 2025-01-23 DIAGNOSIS — H52.12 MYOPIA OF LEFT EYE: ICD-10-CM

## 2025-01-23 DIAGNOSIS — H52.4 PRESBYOPIA: ICD-10-CM

## 2025-01-23 PROCEDURE — 92015 DETERMINE REFRACTIVE STATE: CPT | Performed by: OPHTHALMOLOGY

## 2025-01-23 PROCEDURE — 77067 SCR MAMMO BI INCL CAD: CPT

## 2025-01-23 PROCEDURE — 92014 COMPRE OPH EXAM EST PT 1/>: CPT | Performed by: OPHTHALMOLOGY

## 2025-01-23 ASSESSMENT — REFRACTION_WEARINGRX
OD_SPHERE: -0.25
OS_ADD: +3.00
OD_ADD: +3.00
OS_SPHERE: -1.00
OS_CYLINDER: -0.75
OS_AXIS: 100
OD_AXIS: 109
OD_CYLINDER: -0.25

## 2025-01-23 ASSESSMENT — VISUAL ACUITY
CORRECTION_TYPE: GLASSES
OD_CC: J1+
OS_CC: J1+
METHOD: SNELLEN - LINEAR
OD_CC: 20/25
OS_CC: 20/25+2

## 2025-01-23 ASSESSMENT — CONF VISUAL FIELD
OD_NORMAL: 1
OS_SUPERIOR_TEMPORAL_RESTRICTION: 0
OD_INFERIOR_TEMPORAL_RESTRICTION: 0
OS_INFERIOR_TEMPORAL_RESTRICTION: 0
OS_NORMAL: 1
OD_SUPERIOR_TEMPORAL_RESTRICTION: 0
OS_INFERIOR_NASAL_RESTRICTION: 0
OD_SUPERIOR_NASAL_RESTRICTION: 0
OD_INFERIOR_NASAL_RESTRICTION: 0
OS_SUPERIOR_NASAL_RESTRICTION: 0

## 2025-01-23 ASSESSMENT — SLIT LAMP EXAM - LIDS
COMMENTS: NORMAL
COMMENTS: NORMAL

## 2025-01-23 ASSESSMENT — REFRACTION_MANIFEST
OD_ADD: +3.00
OS_CYLINDER: -0.75
OD_CYLINDER: -0.25
OS_SPHERE: -1.00
OD_SPHERE: PLANO
OS_ADD: +3.00
OD_AXIS: 109
OS_AXIS: 100

## 2025-01-23 ASSESSMENT — ENCOUNTER SYMPTOMS
ENDOCRINE NEGATIVE: 0
GASTROINTESTINAL NEGATIVE: 0
HEMATOLOGIC/LYMPHATIC NEGATIVE: 0
PSYCHIATRIC NEGATIVE: 0
RESPIRATORY NEGATIVE: 0
CARDIOVASCULAR NEGATIVE: 0
NEUROLOGICAL NEGATIVE: 0
ALLERGIC/IMMUNOLOGIC NEGATIVE: 0
EYES NEGATIVE: 1
CONSTITUTIONAL NEGATIVE: 0
MUSCULOSKELETAL NEGATIVE: 0

## 2025-01-23 ASSESSMENT — EXTERNAL EXAM - RIGHT EYE: OD_EXAM: NORMAL

## 2025-01-23 ASSESSMENT — TONOMETRY
OD_IOP_MMHG: 14
OS_IOP_MMHG: 14
IOP_METHOD: GOLDMANN APPLANATION

## 2025-01-23 ASSESSMENT — CUP TO DISC RATIO
OD_RATIO: 0.3
OS_RATIO: 0.3

## 2025-01-23 ASSESSMENT — EXTERNAL EXAM - LEFT EYE: OS_EXAM: NORMAL

## 2025-01-23 NOTE — PROGRESS NOTES
Assessment/Plan   Diagnoses and all orders for this visit:  Type 2 diabetes mellitus without complication, without long-term current use of insulin (Multi)  Blurred vision  -no evidence of diabetic retinopathy at the present time  -pt was advised of the importance of good diabetes control and the importance of a yearly dilated diabetic exam    Dry eyes  -Start artificial tears both eyes (OU) qid  -stress compliance    Combined forms of age-related cataract of both eyes  Not visually significant at the present time  continue to monitor    Myopia of left eye  Regular astigmatism of both eyes  Presbyopia  Refractive error  -give Rx for new glasses    Return for a dilated exam in  12  months or sooner if having any problems

## 2025-02-04 LAB
CHOLEST SERPL-MCNC: 170 MG/DL
CHOLEST/HDLC SERPL: 2.4 (CALC)
FERRITIN SERPL-MCNC: 17 NG/ML (ref 16–288)
HDLC SERPL-MCNC: 70 MG/DL
IRON SATN MFR SERPL: 10 % (CALC) (ref 16–45)
IRON SERPL-MCNC: 41 MCG/DL (ref 45–160)
LDLC SERPL CALC-MCNC: 73 MG/DL (CALC)
NONHDLC SERPL-MCNC: 100 MG/DL (CALC)
TIBC SERPL-MCNC: 428 MCG/DL (CALC) (ref 250–450)
TRIGL SERPL-MCNC: 168 MG/DL

## 2025-02-15 DIAGNOSIS — E11.9 TYPE 2 DIABETES MELLITUS WITHOUT COMPLICATION, WITHOUT LONG-TERM CURRENT USE OF INSULIN (MULTI): ICD-10-CM

## 2025-02-15 DIAGNOSIS — D50.9 IRON DEFICIENCY ANEMIA, UNSPECIFIED IRON DEFICIENCY ANEMIA TYPE: Primary | ICD-10-CM

## 2025-02-15 RX ORDER — FERROUS SULFATE 324(65)MG
65 TABLET, DELAYED RELEASE (ENTERIC COATED) ORAL
Qty: 30 TABLET | Refills: 3 | Status: SHIPPED | OUTPATIENT
Start: 2025-02-15 | End: 2025-06-15

## 2025-02-17 RX ORDER — METFORMIN HYDROCHLORIDE 500 MG/1
500 TABLET ORAL
Qty: 180 TABLET | Refills: 3 | Status: SHIPPED | OUTPATIENT
Start: 2025-02-17

## 2025-03-25 ENCOUNTER — APPOINTMENT (OUTPATIENT)
Dept: PRIMARY CARE | Facility: CLINIC | Age: 75
End: 2025-03-25
Payer: MEDICARE

## 2025-04-07 ENCOUNTER — APPOINTMENT (OUTPATIENT)
Dept: PRIMARY CARE | Facility: CLINIC | Age: 75
End: 2025-04-07
Payer: MEDICARE

## 2025-04-07 VITALS — RESPIRATION RATE: 16 BRPM | HEART RATE: 68 BPM | SYSTOLIC BLOOD PRESSURE: 120 MMHG | DIASTOLIC BLOOD PRESSURE: 70 MMHG

## 2025-04-07 DIAGNOSIS — E13.69 OTHER SPECIFIED DIABETES MELLITUS WITH OTHER SPECIFIED COMPLICATION, UNSPECIFIED WHETHER LONG TERM INSULIN USE (MULTI): ICD-10-CM

## 2025-04-07 DIAGNOSIS — E78.49 OTHER HYPERLIPIDEMIA: ICD-10-CM

## 2025-04-07 DIAGNOSIS — I50.42 CHRONIC COMBINED SYSTOLIC (CONGESTIVE) AND DIASTOLIC (CONGESTIVE) HEART FAILURE: ICD-10-CM

## 2025-04-07 DIAGNOSIS — I35.0 MODERATE AORTIC STENOSIS: ICD-10-CM

## 2025-04-07 DIAGNOSIS — I10 PRIMARY HYPERTENSION: Primary | ICD-10-CM

## 2025-04-07 LAB
POC FINGERSTICK BLOOD GLUCOSE: 121 MG/DL (ref 70–100)
POC HEMOGLOBIN A1C: 6.9 % (ref 4.2–6.5)

## 2025-04-07 PROCEDURE — 1160F RVW MEDS BY RX/DR IN RCRD: CPT | Performed by: INTERNAL MEDICINE

## 2025-04-07 PROCEDURE — 3078F DIAST BP <80 MM HG: CPT | Performed by: INTERNAL MEDICINE

## 2025-04-07 PROCEDURE — 99214 OFFICE O/P EST MOD 30 MIN: CPT | Performed by: INTERNAL MEDICINE

## 2025-04-07 PROCEDURE — 82962 GLUCOSE BLOOD TEST: CPT | Performed by: INTERNAL MEDICINE

## 2025-04-07 PROCEDURE — 83036 HEMOGLOBIN GLYCOSYLATED A1C: CPT | Performed by: INTERNAL MEDICINE

## 2025-04-07 PROCEDURE — 3074F SYST BP LT 130 MM HG: CPT | Performed by: INTERNAL MEDICINE

## 2025-04-07 PROCEDURE — 1159F MED LIST DOCD IN RCRD: CPT | Performed by: INTERNAL MEDICINE

## 2025-04-07 PROCEDURE — G2211 COMPLEX E/M VISIT ADD ON: HCPCS | Performed by: INTERNAL MEDICINE

## 2025-04-07 PROCEDURE — 1036F TOBACCO NON-USER: CPT | Performed by: INTERNAL MEDICINE

## 2025-04-07 NOTE — PROGRESS NOTES
Nighat Holland is a 75 y.o. female here for a Medicare Wellness Exam.    No chief complaint on file.       Patient with a past medical history of CAD s/p CABG x 3 in 2003 and s/p PCI of RCA, cardiomyopathy with a EF of 45%, moderate aortic stenosis, chronic systolic and diastolic congestive heart failure, carotid artery stenosis s/p right carotid endarterectomy, DM, HLD, HTN, Osteoarthritis, DISH, Spinal stenosis, PAD, Undifferentiated Connective Tissue disease, OAB, Neuropathy, Mammogram in June, Colonoscopy due in 2028        Still gets occasional CP  Cardiology work up reviewed    BM OK  Energy level ok  Appetite OK             Past Medical History:   Diagnosis Date    Chest pain, unspecified 12/29/2014    Chest pain    Encounter for gynecological examination (general) (routine) without abnormal findings     Pap test, as part of routine gynecological examination    Essential (primary) hypertension 11/15/2022    Hypertension    Hyperlipidemia, unspecified 11/15/2022    Hyperlipidemia    Personal history of other diseases of the musculoskeletal system and connective tissue     Personal history of arthritis    Personal history of other medical treatment     H/O mammogram    Unspecified atherosclerosis     Atherosclerosis        Current Outpatient Medications   Medication Instructions    amLODIPine (NORVASC) 5 mg, oral, Daily    aspirin 81 mg EC tablet 1 tablet, oral, Daily    benazepriL-hydrochlorothiazide (Lotensin HCT) 20-25 mg tablet 1 tablet, oral, Daily    blood sugar diagnostic (OneTouch Verio test strips) strip Daily    clopidogrel (PLAVIX) 75 mg, oral, Daily    diclofenac epolamine 1.3 % patch transdermal, 2 times daily, APPLY PATCH TO AFFECTED AREA    diclofenac sodium (Voltaren) 1 % gel gel transdermal, Daily, APPLY SPARINGLY TO AFFECTED AREA    empagliflozin (JARDIANCE) 25 mg, oral, Daily    ezetimibe (ZETIA) 10 mg, oral, Daily    ferrous sulfate 65 mg, oral, Daily with breakfast    FreeStyle Chato sensor  system (FreeStyle Chato 2 Sensor) kit Use as instructed    ibuprofen 600 mg, oral, 3 times daily PRN    lancets (OneTouch UltraSoft Lancets) misc miscellaneous, Daily    metFORMIN (GLUCOPHAGE) 500 mg, oral, 2 times daily (morning and late afternoon)    metoprolol succinate XL (Toprol-XL) 50 mg 24 hr tablet TAKE 1 AND 1/2 TABLETS BY MOUTH  ONCE DAILY DO NOT CRUSH OR CHEW    nitroglycerin (NITROSTAT) 0.4 mg, sublingual, Every 5 min PRN    potassium chloride CR 10 mEq ER tablet 1 tablet, oral, Daily    psyllium (Metamucil) 0.52 gram capsule 1 capsule, oral, 2 times daily    ranolazine (Ranexa) 500 mg 12 hr tablet 1 tablet, oral, Every 12 hours    rOPINIRole (REQUIP) 1 mg, oral, Nightly    rosuvastatin (CRESTOR) 20 mg, oral, Every other day    triamcinolone (Kenalog) 0.1 % ointment 2 times daily, APPLY AND GENTLY MASSAGE INTO AFFECTED AREA(S)    Trulicity 0.75 mg, subcutaneous, Weekly       Review of Systems     Constitutional: no fever, no chills, not feeling poorly, not feeling tired and no recent weight gain, no recent weight loss.   ENT: no earache, no hearing loss, no nosebleeds, no nasal discharge, no sore throat and no hoarseness.   Cardiovascular: the heart rate was not slow, the heart rate was not fast, no chest pain, no palpitations, no intermittent leg claudication and no lower extremity edema.   Respiratory: no cough, wheezing or shortness of breath at rest or exertion  Gastrointestinal: no abdominal pain, no constipation, no melena, no nausea, no diarrhea, no vomiting and no blood in stools.   Musculoskeletal: no arthralgias, no myalgias, no back pain, no joint swelling, no joint stiffness, no limb pain and no limb swelling.   Integumentary: no skin rashes, no skin lesions, no itching, no skin wound and no dry skin.   Neurological: no headache, no confusion, no numbness, no dizziness, no tingling and no fainting.   All other systems have been reviewed and are negative for complaint.        Physical  Exam    Constitutional   General appearance: Alert and in no acute distress.     Pulmonary   Respiratory assessment: No respiratory distress, normal respiratory rhythm and effort.    Auscultation of Lungs: Clear bilateral breath sounds.   Cardiovascular   Auscultation of heart: Apical pulse normal, heart rate and rhythm normal, normal S1 and S2, no murmurs and no pericardial rub.    Exam for edema: No peripheral edema.   Abdomen   Abdominal Exam: No bruits, normal bowel sounds, soft, non-tender, no abdominal mass palpated.    Liver and Spleen exam: No hepato-splenomegaly.   Musculoskeletal   Examination of gait: Normal.    Inspection of digits and nails: No clubbing or cyanosis of the fingernails.    Inspection/palpation of joints, bones and muscles: No joint swelling. Normal movement of all extremities.   Skin   Skin inspection: Normal skin color and pigmentation, normal skin turgor and no visible rash.   Neurologic   Cranial nerves: Nerves 2-12 were intact, no focal neuro defects.       Assessment/Plan          Patient with a past medical history of CAD s/p CABG x 3 in 2003 and s/p PCI of RCA, cardiomyopathy with a EF of 45%, moderate aortic stenosis, chronic systolic and diastolic congestive heart failure, carotid artery stenosis s/p right carotid endarterectomy, DM, HLD, HTN, Osteoarthritis, DISH, Spinal stenosis, PAD, Undifferentiated Connective Tissue disease, OAB, Neuropathy, Mammogram in June, Colonoscopy due in 2028      # HTN  Stable  Continue current medications     # HLD  Stable  Continue current medications  Watch salt, avoid excessive caffeine  Avoid processed meats/ sugars/juices Instead eat fresh fruit  Add walnuts and almonds to your diet  exercise 6 days a week for 30 minutes     # DM  A1c is 6.9%  Did not start Trulicity as too expensive     # CAD/ Chronic combined systolic/ diastolic CHF  Saw her cardiologist at [facility]  Stress test was unremarkable  Continue medical management  Echocardiogram shows  moderate aortic stenosis  Will be followed on a regular basis

## 2025-04-08 RX ORDER — EMPAGLIFLOZIN 25 MG/1
25 TABLET, FILM COATED ORAL DAILY
Qty: 90 TABLET | Refills: 3 | Status: SHIPPED | OUTPATIENT
Start: 2025-04-08

## 2025-06-03 ENCOUNTER — APPOINTMENT (OUTPATIENT)
Dept: NEUROLOGY | Facility: CLINIC | Age: 75
End: 2025-06-03
Payer: MEDICARE

## 2025-08-11 ENCOUNTER — APPOINTMENT (OUTPATIENT)
Dept: PRIMARY CARE | Facility: CLINIC | Age: 75
End: 2025-08-11
Payer: MEDICARE

## 2025-08-11 VITALS
HEART RATE: 66 BPM | DIASTOLIC BLOOD PRESSURE: 80 MMHG | SYSTOLIC BLOOD PRESSURE: 120 MMHG | BODY MASS INDEX: 26.73 KG/M2 | TEMPERATURE: 98 F | RESPIRATION RATE: 18 BRPM | WEIGHT: 141.4 LBS

## 2025-08-11 DIAGNOSIS — L65.9 ALOPECIA: Primary | ICD-10-CM

## 2025-08-11 DIAGNOSIS — J84.10 PULMONARY FIBROSIS, UNSPECIFIED (MULTI): ICD-10-CM

## 2025-08-11 DIAGNOSIS — D50.9 IRON DEFICIENCY ANEMIA, UNSPECIFIED IRON DEFICIENCY ANEMIA TYPE: ICD-10-CM

## 2025-08-11 DIAGNOSIS — E13.69 OTHER SPECIFIED DIABETES MELLITUS WITH OTHER SPECIFIED COMPLICATION, UNSPECIFIED WHETHER LONG TERM INSULIN USE (MULTI): ICD-10-CM

## 2025-08-11 DIAGNOSIS — E78.49 OTHER HYPERLIPIDEMIA: ICD-10-CM

## 2025-08-11 DIAGNOSIS — I10 PRIMARY HYPERTENSION: ICD-10-CM

## 2025-08-11 LAB
POC FINGERSTICK BLOOD GLUCOSE: 164 MG/DL (ref 70–100)
POC HEMOGLOBIN A1C: 7.3 % (ref 4.2–6.5)

## 2025-08-11 PROCEDURE — 3074F SYST BP LT 130 MM HG: CPT | Performed by: INTERNAL MEDICINE

## 2025-08-11 PROCEDURE — G2211 COMPLEX E/M VISIT ADD ON: HCPCS | Performed by: INTERNAL MEDICINE

## 2025-08-11 PROCEDURE — 82962 GLUCOSE BLOOD TEST: CPT | Performed by: INTERNAL MEDICINE

## 2025-08-11 PROCEDURE — 99214 OFFICE O/P EST MOD 30 MIN: CPT | Performed by: INTERNAL MEDICINE

## 2025-08-11 PROCEDURE — 3079F DIAST BP 80-89 MM HG: CPT | Performed by: INTERNAL MEDICINE

## 2025-08-11 PROCEDURE — 83036 HEMOGLOBIN GLYCOSYLATED A1C: CPT | Performed by: INTERNAL MEDICINE

## 2025-08-11 RX ORDER — FERROUS SULFATE 325(65) MG
325 TABLET, DELAYED RELEASE (ENTERIC COATED) ORAL
Qty: 30 TABLET | Refills: 11 | Status: SHIPPED | OUTPATIENT
Start: 2025-08-11 | End: 2026-08-11

## 2025-08-26 DIAGNOSIS — D50.9 IRON DEFICIENCY ANEMIA, UNSPECIFIED IRON DEFICIENCY ANEMIA TYPE: ICD-10-CM

## 2025-08-27 DIAGNOSIS — I10 PRIMARY HYPERTENSION: ICD-10-CM

## 2025-08-28 RX ORDER — BENAZEPRIL HYDROCHLORIDE AND HYDROCHLOROTHIAZIDE 20; 25 MG/1; MG/1
1 TABLET ORAL DAILY
Qty: 90 TABLET | Refills: 3 | Status: SHIPPED | OUTPATIENT
Start: 2025-08-28

## 2025-09-04 RX ORDER — FERROUS SULFATE 325(65) MG
325 TABLET, DELAYED RELEASE (ENTERIC COATED) ORAL
Qty: 30 TABLET | Refills: 11 | Status: SHIPPED | OUTPATIENT
Start: 2025-09-04 | End: 2026-09-04

## 2025-10-21 ENCOUNTER — APPOINTMENT (OUTPATIENT)
Dept: DERMATOLOGY | Facility: CLINIC | Age: 75
End: 2025-10-21
Payer: MEDICARE

## 2025-12-11 ENCOUNTER — APPOINTMENT (OUTPATIENT)
Dept: PRIMARY CARE | Facility: CLINIC | Age: 75
End: 2025-12-11
Payer: MEDICARE

## 2026-01-29 ENCOUNTER — APPOINTMENT (OUTPATIENT)
Dept: OPHTHALMOLOGY | Facility: CLINIC | Age: 76
End: 2026-01-29
Payer: MEDICARE